# Patient Record
Sex: FEMALE | Race: WHITE | URBAN - METROPOLITAN AREA
[De-identification: names, ages, dates, MRNs, and addresses within clinical notes are randomized per-mention and may not be internally consistent; named-entity substitution may affect disease eponyms.]

---

## 2017-02-09 ENCOUNTER — ANTI-COAG VISIT (OUTPATIENT)
Dept: PHARMACY | Facility: CLINIC | Age: 82
End: 2017-02-09

## 2017-02-09 DIAGNOSIS — I48.20 CHRONIC ATRIAL FIBRILLATION (HCC): ICD-10-CM

## 2017-02-09 LAB — INR BLD: 2.8

## 2017-03-23 ENCOUNTER — ANTI-COAG VISIT (OUTPATIENT)
Dept: PHARMACY | Facility: CLINIC | Age: 82
End: 2017-03-23

## 2017-03-23 DIAGNOSIS — I48.20 CHRONIC ATRIAL FIBRILLATION (HCC): ICD-10-CM

## 2017-03-23 LAB — INR BLD: 3.2

## 2017-03-30 RX ORDER — AMIODARONE HYDROCHLORIDE 200 MG/1
200 TABLET ORAL
Qty: 30 TABLET | Refills: 3 | Status: SHIPPED | OUTPATIENT
Start: 2017-03-30 | End: 2017-05-04 | Stop reason: SDUPTHER

## 2017-04-20 ENCOUNTER — ANTI-COAG VISIT (OUTPATIENT)
Dept: PHARMACY | Facility: CLINIC | Age: 82
End: 2017-04-20

## 2017-04-20 DIAGNOSIS — I48.20 CHRONIC ATRIAL FIBRILLATION (HCC): ICD-10-CM

## 2017-04-20 LAB — INR BLD: 2.7

## 2017-04-25 ENCOUNTER — TELEPHONE (OUTPATIENT)
Dept: ADMINISTRATIVE | Age: 82
End: 2017-04-25

## 2017-05-04 ENCOUNTER — OFFICE VISIT (OUTPATIENT)
Dept: CARDIOLOGY CLINIC | Age: 82
End: 2017-05-04

## 2017-05-04 VITALS
HEIGHT: 62 IN | BODY MASS INDEX: 22.67 KG/M2 | SYSTOLIC BLOOD PRESSURE: 130 MMHG | WEIGHT: 123.2 LBS | OXYGEN SATURATION: 98 % | HEART RATE: 65 BPM | DIASTOLIC BLOOD PRESSURE: 90 MMHG

## 2017-05-04 DIAGNOSIS — Z98.890 H/O MITRAL VALVE REPAIR: ICD-10-CM

## 2017-05-04 DIAGNOSIS — E78.2 MIXED HYPERLIPIDEMIA: ICD-10-CM

## 2017-05-04 DIAGNOSIS — I50.22 CHRONIC SYSTOLIC HEART FAILURE (HCC): ICD-10-CM

## 2017-05-04 DIAGNOSIS — R60.0 BILATERAL EDEMA OF LOWER EXTREMITY: ICD-10-CM

## 2017-05-04 DIAGNOSIS — I10 ESSENTIAL HYPERTENSION: ICD-10-CM

## 2017-05-04 DIAGNOSIS — I48.20 CHRONIC ATRIAL FIBRILLATION (HCC): Primary | ICD-10-CM

## 2017-05-04 LAB — T4 FREE: 1.2 NG/DL (ref 0.9–1.8)

## 2017-05-04 PROCEDURE — 99214 OFFICE O/P EST MOD 30 MIN: CPT | Performed by: INTERNAL MEDICINE

## 2017-05-04 PROCEDURE — 93000 ELECTROCARDIOGRAM COMPLETE: CPT | Performed by: INTERNAL MEDICINE

## 2017-05-04 RX ORDER — ATORVASTATIN CALCIUM 10 MG/1
10 TABLET, FILM COATED ORAL DAILY
Qty: 90 TABLET | Refills: 10 | Status: SHIPPED | OUTPATIENT
Start: 2017-05-04 | End: 2018-01-01 | Stop reason: SDUPTHER

## 2017-05-04 RX ORDER — WARFARIN SODIUM 2 MG/1
TABLET ORAL
Qty: 30 TABLET | Refills: 10 | Status: SHIPPED | OUTPATIENT
Start: 2017-05-04 | End: 2018-02-17 | Stop reason: SDUPTHER

## 2017-05-04 RX ORDER — FUROSEMIDE 40 MG/1
40 TABLET ORAL
Qty: 30 TABLET | Refills: 10 | Status: SHIPPED | OUTPATIENT
Start: 2017-05-04 | End: 2018-05-24 | Stop reason: SDUPTHER

## 2017-05-04 RX ORDER — LISINOPRIL 5 MG/1
5 TABLET ORAL DAILY
Qty: 30 TABLET | Refills: 10 | Status: SHIPPED | OUTPATIENT
Start: 2017-05-04 | End: 2018-04-26 | Stop reason: SDUPTHER

## 2017-05-04 RX ORDER — LEVOTHYROXINE SODIUM 0.03 MG/1
25 TABLET ORAL DAILY
Qty: 30 TABLET | Refills: 10 | Status: SHIPPED | OUTPATIENT
Start: 2017-05-04 | End: 2018-04-05 | Stop reason: SDUPTHER

## 2017-05-04 RX ORDER — AMIODARONE HYDROCHLORIDE 200 MG/1
200 TABLET ORAL
Qty: 30 TABLET | Refills: 10 | Status: SHIPPED | OUTPATIENT
Start: 2017-05-04 | End: 2018-05-24 | Stop reason: SDUPTHER

## 2017-05-04 RX ORDER — CARVEDILOL 25 MG/1
25 TABLET ORAL 2 TIMES DAILY WITH MEALS
Qty: 60 TABLET | Refills: 10 | Status: SHIPPED | OUTPATIENT
Start: 2017-05-04 | End: 2018-01-01 | Stop reason: SDUPTHER

## 2017-05-25 ENCOUNTER — ANTI-COAG VISIT (OUTPATIENT)
Dept: PHARMACY | Facility: CLINIC | Age: 82
End: 2017-05-25

## 2017-05-25 DIAGNOSIS — I48.20 CHRONIC ATRIAL FIBRILLATION (HCC): ICD-10-CM

## 2017-05-25 LAB — INR BLD: 2.2

## 2017-06-22 ENCOUNTER — ANTI-COAG VISIT (OUTPATIENT)
Dept: PHARMACY | Facility: CLINIC | Age: 82
End: 2017-06-22

## 2017-06-22 DIAGNOSIS — I48.20 CHRONIC ATRIAL FIBRILLATION (HCC): ICD-10-CM

## 2017-06-22 LAB — INR BLD: 2

## 2017-07-27 ENCOUNTER — ANTI-COAG VISIT (OUTPATIENT)
Dept: PHARMACY | Facility: CLINIC | Age: 82
End: 2017-07-27

## 2017-07-27 DIAGNOSIS — I48.20 CHRONIC ATRIAL FIBRILLATION (HCC): ICD-10-CM

## 2017-07-27 LAB — INR BLD: 2.8

## 2017-08-24 ENCOUNTER — ANTI-COAG VISIT (OUTPATIENT)
Dept: PHARMACY | Facility: CLINIC | Age: 82
End: 2017-08-24

## 2017-08-24 DIAGNOSIS — I48.20 CHRONIC ATRIAL FIBRILLATION (HCC): ICD-10-CM

## 2017-08-24 LAB — INR BLD: 3

## 2017-09-07 ENCOUNTER — OFFICE VISIT (OUTPATIENT)
Dept: ORTHOPEDIC SURGERY | Age: 82
End: 2017-09-07

## 2017-09-07 VITALS
SYSTOLIC BLOOD PRESSURE: 109 MMHG | HEIGHT: 63 IN | WEIGHT: 119 LBS | DIASTOLIC BLOOD PRESSURE: 60 MMHG | BODY MASS INDEX: 21.09 KG/M2 | HEART RATE: 64 BPM

## 2017-09-07 DIAGNOSIS — S50.02XA LEFT ELBOW CONTUSION: Primary | ICD-10-CM

## 2017-09-07 DIAGNOSIS — M70.22 OLECRANON BURSITIS OF LEFT ELBOW: ICD-10-CM

## 2017-09-07 PROCEDURE — 99214 OFFICE O/P EST MOD 30 MIN: CPT | Performed by: ORTHOPAEDIC SURGERY

## 2017-10-19 ENCOUNTER — ANTI-COAG VISIT (OUTPATIENT)
Dept: PHARMACY | Facility: CLINIC | Age: 82
End: 2017-10-19

## 2017-10-19 DIAGNOSIS — I48.20 CHRONIC ATRIAL FIBRILLATION (HCC): ICD-10-CM

## 2017-10-19 LAB — INR BLD: 2.8

## 2017-11-01 ENCOUNTER — HOSPITAL ENCOUNTER (OUTPATIENT)
Dept: OTHER | Age: 82
Discharge: HOME | End: 2017-11-30
Attending: INTERNAL MEDICINE | Admitting: INTERNAL MEDICINE

## 2017-11-16 ENCOUNTER — ANTI-COAG VISIT (OUTPATIENT)
Dept: PHARMACY | Facility: CLINIC | Age: 82
End: 2017-11-16

## 2017-11-16 DIAGNOSIS — I48.20 CHRONIC ATRIAL FIBRILLATION (HCC): ICD-10-CM

## 2017-11-16 LAB — INR BLD: 2

## 2017-11-16 NOTE — PROGRESS NOTES
Ms. Day Kaur is a 80 y.o.  female with history of Afib who presents today for anticoagulation monitoring and adjustment. Patient verifies current dosing regimen. Patient denies s/s bleeding/bruising/swelling/SOB. No blood in urine or stool. No dietary changes. No changes in medication/OTC agents/Herbals. No change in alcohol use. No missed doses. No Procedures scheduled in the future at this time. Lab Results   Component Value Date    INR 2 11/16/2017    INR 2.8 10/19/2017    INR 3 08/24/2017       Patient appears well. No changes, no problems. Coumadin dose: Continue warfarin 2mg daily EXCEPT 1mg (half tablet) every Thursday and Tuesday      Recheck INR in 4 weeks. Patient reminded to call the Anticoagulation Clinic with any medication changes. Patient given instructions utilizing the teach back method. After visit summary printed and reviewed with patient. Medications reviewed and Warfarin dose updated.

## 2017-11-21 ENCOUNTER — PROCEDURE VISIT (OUTPATIENT)
Dept: CARDIOLOGY CLINIC | Age: 82
End: 2017-11-21

## 2017-11-21 DIAGNOSIS — Z95.0 PACEMAKER: Primary | ICD-10-CM

## 2017-12-01 ENCOUNTER — HOSPITAL ENCOUNTER (OUTPATIENT)
Dept: OTHER | Age: 82
Discharge: HOME | End: 2017-12-31
Attending: INTERNAL MEDICINE | Admitting: INTERNAL MEDICINE

## 2017-12-14 ENCOUNTER — ANTI-COAG VISIT (OUTPATIENT)
Dept: PHARMACY | Facility: CLINIC | Age: 82
End: 2017-12-14

## 2017-12-14 DIAGNOSIS — I48.0 PAROXYSMAL ATRIAL FIBRILLATION (HCC): ICD-10-CM

## 2017-12-14 LAB — INR BLD: 2.4

## 2017-12-14 NOTE — PROGRESS NOTES
Ms. Tiny White is a 80 y.o.  female with history of Afib who presents today for anticoagulation monitoring and adjustment. Patient verifies current dosing regimen  Patient denies s/s bleeding/bruising/swelling/SOB  No blood in urine or stool. No dietary changes. No changes in medication/OTC agents/Herbals. No change in alcohol use. No missed doses. No Procedures scheduled in the future at this time. Lab Results   Component Value Date    INR 2.4 12/14/2017    INR 2 11/16/2017    INR 2.8 10/19/2017       No changes          After visit summary printed and reviewed with patient.       Medications reviewed and updated on home medication list Yes  Warfarin dose updated on patient home medication list:Yes     Influenza vaccine: received    [] given    [] declined   [x] received previously   [] plans to receive at a later time   [] refused    [x] documented in EPIC

## 2018-01-01 ENCOUNTER — ANTI-COAG VISIT (OUTPATIENT)
Dept: PHARMACY | Facility: CLINIC | Age: 83
End: 2018-01-01

## 2018-01-01 ENCOUNTER — TELEPHONE (OUTPATIENT)
Dept: PRIMARY CARE CLINIC | Age: 83
End: 2018-01-01

## 2018-01-01 ENCOUNTER — HOSPITAL ENCOUNTER (OUTPATIENT)
Dept: OTHER | Age: 83
Discharge: HOME | End: 2018-01-31
Attending: INTERNAL MEDICINE | Admitting: INTERNAL MEDICINE

## 2018-01-01 ENCOUNTER — ANTI-COAG VISIT (OUTPATIENT)
Dept: PHARMACY | Age: 83
End: 2018-01-01
Payer: MEDICARE

## 2018-01-01 ENCOUNTER — HOSPITAL ENCOUNTER (OUTPATIENT)
Dept: OTHER | Age: 83
Discharge: HOME | End: 2018-09-01
Attending: INTERNAL MEDICINE | Admitting: INTERNAL MEDICINE

## 2018-01-01 ENCOUNTER — HOSPITAL ENCOUNTER (OUTPATIENT)
Dept: OTHER | Age: 83
Discharge: HOME | End: 2018-07-31
Attending: INTERNAL MEDICINE | Admitting: INTERNAL MEDICINE

## 2018-01-01 ENCOUNTER — TELEPHONE (OUTPATIENT)
Dept: PHARMACY | Facility: CLINIC | Age: 83
End: 2018-01-01

## 2018-01-01 ENCOUNTER — APPOINTMENT (OUTPATIENT)
Dept: PHARMACY | Age: 83
End: 2018-01-01
Payer: MEDICARE

## 2018-01-01 ENCOUNTER — OFFICE VISIT (OUTPATIENT)
Dept: CARDIOLOGY CLINIC | Age: 83
End: 2018-01-01
Payer: MEDICARE

## 2018-01-01 ENCOUNTER — HOSPITAL ENCOUNTER (OUTPATIENT)
Dept: OTHER | Age: 83
Discharge: HOME | End: 2018-08-31
Attending: INTERNAL MEDICINE | Admitting: INTERNAL MEDICINE

## 2018-01-01 VITALS
OXYGEN SATURATION: 98 % | SYSTOLIC BLOOD PRESSURE: 126 MMHG | WEIGHT: 114 LBS | HEIGHT: 63 IN | HEART RATE: 72 BPM | BODY MASS INDEX: 20.2 KG/M2 | DIASTOLIC BLOOD PRESSURE: 68 MMHG

## 2018-01-01 DIAGNOSIS — E78.2 MIXED HYPERLIPIDEMIA: ICD-10-CM

## 2018-01-01 DIAGNOSIS — I50.22 CHRONIC SYSTOLIC HEART FAILURE (HCC): ICD-10-CM

## 2018-01-01 DIAGNOSIS — I48.0 PAROXYSMAL ATRIAL FIBRILLATION (HCC): ICD-10-CM

## 2018-01-01 DIAGNOSIS — I48.20 CHRONIC ATRIAL FIBRILLATION (HCC): Primary | ICD-10-CM

## 2018-01-01 DIAGNOSIS — I48.91 ATRIAL FIBRILLATION, UNSPECIFIED TYPE (HCC): ICD-10-CM

## 2018-01-01 LAB
INR BLD: 2.7
INR BLD: 2.8
INR BLD: 3.3
INTERNATIONAL NORMALIZATION RATIO, POC: 2.6
INTERNATIONAL NORMALIZATION RATIO, POC: 2.9
INTERNATIONAL NORMALIZATION RATIO, POC: 3.3
INTERNATIONAL NORMALIZATION RATIO, POC: 4.5

## 2018-01-01 PROCEDURE — 99211 OFF/OP EST MAY X REQ PHY/QHP: CPT

## 2018-01-01 PROCEDURE — 99213 OFFICE O/P EST LOW 20 MIN: CPT | Performed by: INTERNAL MEDICINE

## 2018-01-01 PROCEDURE — 4040F PNEUMOC VAC/ADMIN/RCVD: CPT | Performed by: INTERNAL MEDICINE

## 2018-01-01 PROCEDURE — 93000 ELECTROCARDIOGRAM COMPLETE: CPT | Performed by: INTERNAL MEDICINE

## 2018-01-01 PROCEDURE — G8420 CALC BMI NORM PARAMETERS: HCPCS | Performed by: INTERNAL MEDICINE

## 2018-01-01 PROCEDURE — 1123F ACP DISCUSS/DSCN MKR DOCD: CPT | Performed by: INTERNAL MEDICINE

## 2018-01-01 PROCEDURE — 85610 PROTHROMBIN TIME: CPT

## 2018-01-01 PROCEDURE — 1036F TOBACCO NON-USER: CPT | Performed by: INTERNAL MEDICINE

## 2018-01-01 PROCEDURE — G8427 DOCREV CUR MEDS BY ELIG CLIN: HCPCS | Performed by: INTERNAL MEDICINE

## 2018-01-01 PROCEDURE — 1090F PRES/ABSN URINE INCON ASSESS: CPT | Performed by: INTERNAL MEDICINE

## 2018-01-01 PROCEDURE — 1101F PT FALLS ASSESS-DOCD LE1/YR: CPT | Performed by: INTERNAL MEDICINE

## 2018-01-01 RX ORDER — FUROSEMIDE 40 MG/1
TABLET ORAL
Qty: 30 TABLET | Refills: 9 | Status: SHIPPED | OUTPATIENT
Start: 2018-01-01

## 2018-01-01 RX ORDER — WARFARIN SODIUM 2 MG/1
TABLET ORAL
Qty: 45 TABLET | Refills: 4 | Status: SHIPPED | OUTPATIENT
Start: 2018-01-01

## 2018-01-01 RX ORDER — AMIODARONE HYDROCHLORIDE 200 MG/1
TABLET ORAL
Qty: 30 TABLET | Refills: 9 | Status: SHIPPED | OUTPATIENT
Start: 2018-01-01

## 2018-01-01 RX ORDER — ATORVASTATIN CALCIUM 10 MG/1
TABLET, FILM COATED ORAL
Qty: 90 TABLET | Refills: 0 | Status: SHIPPED | OUTPATIENT
Start: 2018-01-01 | End: 2018-01-01 | Stop reason: SDUPTHER

## 2018-01-01 RX ORDER — ATORVASTATIN CALCIUM 10 MG/1
TABLET, FILM COATED ORAL
Qty: 90 TABLET | Refills: 0 | Status: SHIPPED | OUTPATIENT
Start: 2018-01-01 | End: 2019-01-01 | Stop reason: SDUPTHER

## 2018-01-01 RX ORDER — CARVEDILOL 25 MG/1
TABLET ORAL
Qty: 60 TABLET | Refills: 9 | Status: SHIPPED | OUTPATIENT
Start: 2018-01-01

## 2018-01-01 RX ORDER — CARVEDILOL 25 MG/1
TABLET ORAL
Qty: 60 TABLET | Refills: 9 | Status: SHIPPED | OUTPATIENT
Start: 2018-01-01 | End: 2018-01-01 | Stop reason: SDUPTHER

## 2018-01-01 RX ORDER — LISINOPRIL 5 MG/1
TABLET ORAL
Qty: 30 TABLET | Refills: 9 | Status: SHIPPED | OUTPATIENT
Start: 2018-01-01

## 2018-01-01 RX ORDER — LEVOTHYROXINE SODIUM 0.03 MG/1
TABLET ORAL
Qty: 30 TABLET | Refills: 9 | Status: SHIPPED | OUTPATIENT
Start: 2018-01-01

## 2018-01-25 ENCOUNTER — ANTI-COAG VISIT (OUTPATIENT)
Dept: PHARMACY | Facility: CLINIC | Age: 83
End: 2018-01-25

## 2018-01-25 DIAGNOSIS — I48.0 PAROXYSMAL ATRIAL FIBRILLATION (HCC): ICD-10-CM

## 2018-01-25 LAB — INTERNATIONAL NORMALIZATION RATIO, POC: 1.9

## 2018-02-01 ENCOUNTER — HOSPITAL ENCOUNTER (OUTPATIENT)
Dept: OTHER | Age: 83
Discharge: HOME | End: 2018-02-28
Attending: INTERNAL MEDICINE | Admitting: INTERNAL MEDICINE

## 2018-02-17 DIAGNOSIS — I48.20 CHRONIC ATRIAL FIBRILLATION (HCC): ICD-10-CM

## 2018-02-19 RX ORDER — WARFARIN SODIUM 2 MG/1
TABLET ORAL
Qty: 45 TABLET | Refills: 4 | Status: SHIPPED | OUTPATIENT
Start: 2018-02-19 | End: 2018-01-01 | Stop reason: SDUPTHER

## 2018-03-01 ENCOUNTER — ANTI-COAG VISIT (OUTPATIENT)
Dept: PHARMACY | Facility: CLINIC | Age: 83
End: 2018-03-01

## 2018-03-01 ENCOUNTER — HOSPITAL ENCOUNTER (OUTPATIENT)
Dept: OTHER | Age: 83
Discharge: HOME | End: 2018-03-31
Attending: INTERNAL MEDICINE | Admitting: INTERNAL MEDICINE

## 2018-03-01 DIAGNOSIS — I48.0 PAROXYSMAL ATRIAL FIBRILLATION (HCC): ICD-10-CM

## 2018-03-01 LAB — INTERNATIONAL NORMALIZATION RATIO, POC: 2.4

## 2018-03-01 NOTE — PROGRESS NOTES
Ms. Ana Webb is a 80 y.o.  female with history of Afib who presents today for anticoagulation monitoring and adjustment. Patient verifies current dosing regimen. Patient denies s/s bleeding/bruising/swelling/SOB. No blood in urine or stool. No dietary changes. No changes in medication/OTC agents/Herbals. No change in alcohol use. No missed doses. No Procedures scheduled in the future at this time. Lab Results   Component Value Date    INR 2.4 03/01/2018    INR 1.9 01/25/2018    INR 2.4 12/14/2017       Patient appears well. No changes, no problems. Patient reminded to call the Anticoagulation Clinic with any medication changes. Patient given instructions utilizing the teach back method. After visit summary printed and reviewed with patient. Warfarin dose updated.

## 2018-04-01 ENCOUNTER — HOSPITAL ENCOUNTER (OUTPATIENT)
Dept: OTHER | Age: 83
Discharge: HOME | End: 2018-04-30
Attending: INTERNAL MEDICINE | Admitting: INTERNAL MEDICINE

## 2018-04-05 ENCOUNTER — ANTI-COAG VISIT (OUTPATIENT)
Dept: PHARMACY | Facility: CLINIC | Age: 83
End: 2018-04-05

## 2018-04-05 DIAGNOSIS — I48.0 PAROXYSMAL ATRIAL FIBRILLATION (HCC): ICD-10-CM

## 2018-04-05 LAB — INTERNATIONAL NORMALIZATION RATIO, POC: 2.2

## 2018-04-05 RX ORDER — LEVOTHYROXINE SODIUM 0.03 MG/1
TABLET ORAL
Qty: 30 TABLET | Refills: 9 | Status: SHIPPED | OUTPATIENT
Start: 2018-04-05 | End: 2018-01-01 | Stop reason: SDUPTHER

## 2018-04-26 DIAGNOSIS — I50.22 CHRONIC SYSTOLIC HEART FAILURE (HCC): ICD-10-CM

## 2018-04-26 RX ORDER — LISINOPRIL 5 MG/1
TABLET ORAL
Qty: 30 TABLET | Refills: 9 | Status: SHIPPED | OUTPATIENT
Start: 2018-04-26 | End: 2018-01-01 | Stop reason: SDUPTHER

## 2018-05-01 ENCOUNTER — HOSPITAL ENCOUNTER (OUTPATIENT)
Dept: OTHER | Age: 83
Discharge: HOME | End: 2018-05-31
Attending: INTERNAL MEDICINE | Admitting: INTERNAL MEDICINE

## 2018-05-10 ENCOUNTER — ANTI-COAG VISIT (OUTPATIENT)
Dept: PHARMACY | Facility: CLINIC | Age: 83
End: 2018-05-10

## 2018-05-10 DIAGNOSIS — I48.0 PAROXYSMAL ATRIAL FIBRILLATION (HCC): ICD-10-CM

## 2018-05-10 LAB — INTERNATIONAL NORMALIZATION RATIO, POC: 3

## 2018-05-24 DIAGNOSIS — I50.22 CHRONIC SYSTOLIC HEART FAILURE (HCC): ICD-10-CM

## 2018-05-24 DIAGNOSIS — I48.20 CHRONIC ATRIAL FIBRILLATION (HCC): ICD-10-CM

## 2018-05-24 RX ORDER — FUROSEMIDE 40 MG/1
TABLET ORAL
Qty: 30 TABLET | Refills: 9 | Status: SHIPPED | OUTPATIENT
Start: 2018-05-24 | End: 2018-01-01 | Stop reason: SDUPTHER

## 2018-05-24 RX ORDER — AMIODARONE HYDROCHLORIDE 200 MG/1
TABLET ORAL
Qty: 30 TABLET | Refills: 9 | Status: SHIPPED | OUTPATIENT
Start: 2018-05-24 | End: 2018-01-01 | Stop reason: SDUPTHER

## 2018-06-01 ENCOUNTER — HOSPITAL ENCOUNTER (OUTPATIENT)
Dept: OTHER | Age: 83
Discharge: HOME | End: 2018-06-30
Attending: INTERNAL MEDICINE | Admitting: INTERNAL MEDICINE

## 2018-08-30 NOTE — PROGRESS NOTES
Ms. Krystal Waters is a 80 y.o.  female with history of Afib who presents today for anticoagulation monitoring and adjustment. Patient verifies current dosing regimen  Patient denies s/s bleeding/bruising/swelling/SOB  No blood in urine or stool. No dietary changes. No changes in medication/OTC agents/Herbals. No change in alcohol use. No missed doses. No Procedures scheduled in the future at this time. Lab Results   Component Value Date    INR 2.80 08/30/2018    INR 3.30 07/19/2018    INR 2.70 06/14/2018       Pertinent findings: No changes, no problems. Denies bleeding or bruising. INR is therapeutic today at 2.8. Will continue current dose and recheck INR in 5 weeks per Dr. Andrea Mckeon referral.     Warfarin dosing: Continue Warfarin 2mg daily EXCEPT 1mg (half tablet) every Thursday and Tuesday        After visit summary printed and reviewed with patient.

## 2018-09-27 NOTE — PROGRESS NOTES
[address]   Cardiac Evaluation      Patient: Lexi Blankenship  YOB: 1929  Date: 9/27/18     Chief Complaint   Patient presents with    Atrial Fibrillation    Medication Refill     orders pending         Referring provider: Al Mesa MD    History of Present Illness:  Ms. Sujey Prescott is an 79yo female with history of mitral valve repair, severe cardiomyopathy, s/p biv pacemaker as well as chronic cor pulmonale who was admitted 9/2014 with decompensated CHF. Robotic mitral valve repair (nl coronaries and LV function) by Dr Gavino Joseph. She has history Biv ICD placed by Dr. Leandra Pleitez  12/2013    Today she presents for 6 month follow up AFIB and CHF. She presents with her daughter today in office. She reports her mother's memory is now poor. Skip Salazar states her breathing is well overall. She does present in wheelchair today, but states she is able to walk around her home without limitations. She has done well since Biv ICD. She lives with family in [place] home. She is able to walk up and down the stairs with no complaints. She uses a cane for ambulation. She has had no falls. Denies any dyspnea, chest pain, palpitations and dizziness. Past Medical History:   has a past medical history of Atrial fibrillation ([facility] [address] Cardiomyopathy [facility]); CHF (congestive heart failure) ([facility] [address] Heart murmur; Hyperlipidemia; Hypertension; Mitral insufficiency; and Pacemaker. Surgical History:   has a past surgical history that includes Diagnostic Cardiac Cath Lab Procedure; Coronary angioplasty; Mitral valve surgery; Total hip arthroplasty; pacemaker placement (11/18/11); joint replacement; eye surgery (Bilateral); and pacemaker placement (12/23/13). Social History:  Social History     Social History    Marital status:      Spouse name: N/A    Number of children: N/A    Years of education: N/A     Occupational History    Not on file.      Social History Main Topics    Smoking status: Never Smoker    Smokeless tobacco: Never Used    Alcohol use No    Drug use: No    Sexual activity: No     Other Topics Concern    Not on file     Social History Narrative    No narrative on file       Family History:  family history includes Other in her father and mother. Allergies:  Patient has no known allergies. Review of Systems:   · Constitutional: there has been no unanticipated weight loss. No change in energy or activity level   · Eyes: No visual changes   · ENT: No Headaches, hearing loss or vertigo. No mouth sores or sore throat. · Cardiovascular: Reviewed in HPI  · Respiratory: No cough or wheezing, no sputum production. · Gastrointestinal: No abdominal pain, appetite loss, blood in stools. No change in bowel or bladder habits. · Genitourinary: No nocturia, dysuria, trouble voiding  · Musculoskeletal:  No gait disturbance, weakness or joint complaints. · Integumentary: No rash or pruritis. · Neurological: No headache, change in muscle strength, numbness or tingling. No change in gait, balance, coordination, mood, affect, memory, mentation, behavior. · Psychiatric: No anxiety or depression  · Endocrine: No malaise or fever  · Hematologic/Lymphatic: No abnormal bruising or bleeding, blood clots or swollen lymph nodes. · Allergic/Immunologic: No nasal congestion or hives. Physical Examination:    Vitals:    09/27/18 0915   BP: 126/68   Site: Left Upper Arm   Position: Sitting   Pulse: 72   SpO2: 98%   Weight: 114 lb (51.7 kg)   Height: 5' 3\" (1.6 m)     Body mass index is 20.19 kg/m².      Wt Readings from Last 3 Encounters:   09/27/18 114 lb (51.7 kg)   09/07/17 119 lb (54 kg)   09/04/17 119 lb 7.8 oz (54.2 kg)      BP Readings from Last 3 Encounters:   09/27/18 126/68   09/07/17 109/60   09/04/17 136/64      Constitutional and General Appearance:  appears stated age  Respiratory:  · Normal excursion and expansion without use of accessory muscles  · Resp Auscultation: Normal breath sounds without dullness  Cardiovascular:  · The apical impulses not displaced  · Heart is regular rate and rhythm with normal S1, S2  · The carotid upstroke is normal, no bruit noted   · JVP is not elevated  · Peripheral pulses are symmetrical  · There is no clubbing, cyanosis of the extremities  · 1+ edema  · Femoral Arteries: 2+ and equal  · Pedal Pulses: 2+ and equal   Abdomen:  · No masses or tenderness  · Normal bowel sounds  Neurological/Psychiatric:  · Alert and oriented x3  · Moves all extremities well  · Exhibits normal gait balance and coordination    EK2017, AV Paced    Echo: 2015  Overall left ventricular systolic function is moderately to severely reduced. Ejection fraction is visually estimated to be 30-35 % with diffuse hypokinesis and abnomal septal motion secondary to RV pacing. The right ventricle is not well visualized but systolic function appears mildly reduced. Pacer / ICD wire is visualized in the right ventricle. The left atrium is mildly dilated. Severe mitral annular calcification. Trivial mitral regurgitation. Trivial tricuspid regurgitation. Mild pulmonic regurgitation. [facility] BiV ICD implanted 13 by Dr Roni Hernandez interrogated    Assessment/Plan:      CHF  Patient with a history of cardiomyopathy  EF 30-35% per ECHO 2015  Compensated. Today  Has done very well since BiVICD    Atrial Fibrillation/BiV ICD   Device interrogated done today shows normal function with a few episodes. The longest being 11 minutes  EKG today shows AV Paced; no A. fib  On Amiodarone 200 mg 5 days weekly; Warfarin   INR 2.8 on 2018    Mitral valve repair  Functioning fine. No murmur heard    LE edema  Chronic; unchanged  Encouraged to continue daily walking as tolerated  Takes Lasix 40 mg 5 times weekly. She will return in follow up 6 months with device interrogation. Thank you for allowing to me to participate in the care of [facility], [facility].      Vena Bras,

## 2018-11-08 NOTE — PROGRESS NOTES
Ms. Sasha Washington is a 80 y.o.  female with history of Afib who presents today for anticoagulation monitoring and adjustment. Patient verifies current dosing regimen. Patient denies s/s bleeding/bruising/swelling/SOB. No blood in urine or stool. No dietary changes. No changes in medication/OTC agents/Herbals. No change in alcohol use. No missed doses. No Procedures scheduled in the future at this time. Lab Results   Component Value Date    INR 3.3 11/08/2018    INR 2.6 10/11/2018    INR 4.5 10/04/2018       Patient appears well. No changes, no problems. Coumadin Dose :   HOLD Warfarin today 11-8-18. Then Continue Warfarin 2mg daily EXCEPT 1mg (half tablet) every Thursday and Tuesday      Return in 3 weeks. Patient reminded to call the Anticoagulation Clinic with any medication changes. Patient given instructions utilizing the teach back method. After visit summary printed and reviewed with patient. Warfarin dose updated.

## 2019-01-01 ENCOUNTER — ANTI-COAG VISIT (OUTPATIENT)
Dept: PHARMACY | Age: 84
End: 2019-01-01
Payer: MEDICARE

## 2019-01-01 ENCOUNTER — HOSPITAL ENCOUNTER (INPATIENT)
Age: 84
LOS: 1 days | DRG: 682 | End: 2019-06-03
Attending: EMERGENCY MEDICINE | Admitting: INTERNAL MEDICINE
Payer: MEDICARE

## 2019-01-01 ENCOUNTER — HOSPITAL ENCOUNTER (INPATIENT)
Age: 84
LOS: 9 days | Discharge: NURSING, REHAB OR LONG-TERM CARE | DRG: 551 | End: 2019-05-14
Attending: INTERNAL MEDICINE | Admitting: INTERNAL MEDICINE
Payer: MEDICARE

## 2019-01-01 ENCOUNTER — APPOINTMENT (OUTPATIENT)
Dept: CT IMAGING | Age: 84
End: 2019-01-01
Payer: MEDICARE

## 2019-01-01 ENCOUNTER — HOSPITAL ENCOUNTER (EMERGENCY)
Age: 84
Discharge: ANOTHER HOSPITAL | End: 2019-05-04
Attending: EMERGENCY MEDICINE
Payer: MEDICARE

## 2019-01-01 ENCOUNTER — TELEPHONE (OUTPATIENT)
Dept: PHARMACY | Age: 84
End: 2019-01-01

## 2019-01-01 ENCOUNTER — APPOINTMENT (OUTPATIENT)
Dept: CT IMAGING | Age: 84
DRG: 551 | End: 2019-01-01
Attending: INTERNAL MEDICINE
Payer: MEDICARE

## 2019-01-01 ENCOUNTER — APPOINTMENT (OUTPATIENT)
Dept: GENERAL RADIOLOGY | Age: 84
DRG: 682 | End: 2019-01-01
Payer: MEDICARE

## 2019-01-01 ENCOUNTER — APPOINTMENT (OUTPATIENT)
Dept: GENERAL RADIOLOGY | Age: 84
End: 2019-01-01
Payer: MEDICARE

## 2019-01-01 VITALS
HEART RATE: 71 BPM | TEMPERATURE: 90.9 F | RESPIRATION RATE: 24 BRPM | DIASTOLIC BLOOD PRESSURE: 54 MMHG | SYSTOLIC BLOOD PRESSURE: 112 MMHG | OXYGEN SATURATION: 77 % | WEIGHT: 140.21 LBS | HEIGHT: 62 IN | BODY MASS INDEX: 25.8 KG/M2

## 2019-01-01 VITALS
HEART RATE: 80 BPM | BODY MASS INDEX: 31.29 KG/M2 | HEIGHT: 63 IN | DIASTOLIC BLOOD PRESSURE: 79 MMHG | WEIGHT: 176.6 LBS | RESPIRATION RATE: 18 BRPM | TEMPERATURE: 97.5 F | OXYGEN SATURATION: 93 % | SYSTOLIC BLOOD PRESSURE: 113 MMHG

## 2019-01-01 VITALS
HEART RATE: 97 BPM | DIASTOLIC BLOOD PRESSURE: 77 MMHG | BODY MASS INDEX: 21.44 KG/M2 | OXYGEN SATURATION: 97 % | WEIGHT: 121 LBS | HEIGHT: 63 IN | TEMPERATURE: 97.6 F | SYSTOLIC BLOOD PRESSURE: 126 MMHG | RESPIRATION RATE: 18 BRPM

## 2019-01-01 DIAGNOSIS — S90.821A BLISTER OF RIGHT FOOT, INITIAL ENCOUNTER: ICD-10-CM

## 2019-01-01 DIAGNOSIS — S12.001A CLOSED NONDISPLACED FRACTURE OF FIRST CERVICAL VERTEBRA, UNSPECIFIED FRACTURE MORPHOLOGY, INITIAL ENCOUNTER (HCC): ICD-10-CM

## 2019-01-01 DIAGNOSIS — N18.9 CHRONIC KIDNEY DISEASE, UNSPECIFIED CKD STAGE: ICD-10-CM

## 2019-01-01 DIAGNOSIS — R60.0 LEG EDEMA: ICD-10-CM

## 2019-01-01 DIAGNOSIS — N17.0 ACUTE RENAL FAILURE WITH TUBULAR NECROSIS (HCC): ICD-10-CM

## 2019-01-01 DIAGNOSIS — I50.21 ACUTE SYSTOLIC CONGESTIVE HEART FAILURE (HCC): Primary | ICD-10-CM

## 2019-01-01 DIAGNOSIS — I48.91 ATRIAL FIBRILLATION, UNSPECIFIED TYPE (HCC): ICD-10-CM

## 2019-01-01 DIAGNOSIS — E78.2 MIXED HYPERLIPIDEMIA: ICD-10-CM

## 2019-01-01 DIAGNOSIS — E87.5 HYPERKALEMIA: ICD-10-CM

## 2019-01-01 DIAGNOSIS — S12.100A CLOSED ODONTOID FRACTURE, INITIAL ENCOUNTER (HCC): Primary | ICD-10-CM

## 2019-01-01 DIAGNOSIS — S90.822A BLISTER (NONTHERMAL), LEFT FOOT, INITIAL ENCOUNTER: ICD-10-CM

## 2019-01-01 LAB
A/G RATIO: 1.1 (ref 1.1–2.2)
A/G RATIO: 1.3 (ref 1.1–2.2)
ALBUMIN SERPL-MCNC: 2.9 G/DL (ref 3.4–5)
ALBUMIN SERPL-MCNC: 3 G/DL (ref 3.4–5)
ALBUMIN SERPL-MCNC: 3.2 G/DL (ref 3.4–5)
ALBUMIN SERPL-MCNC: 3.3 G/DL (ref 3.4–5)
ALBUMIN SERPL-MCNC: 3.3 G/DL (ref 3.4–5)
ALBUMIN SERPL-MCNC: 3.4 G/DL (ref 3.4–5)
ALBUMIN SERPL-MCNC: 3.4 G/DL (ref 3.4–5)
ALBUMIN SERPL-MCNC: 3.5 G/DL (ref 3.4–5)
ALBUMIN SERPL-MCNC: 3.6 G/DL (ref 3.4–5)
ALP BLD-CCNC: 108 U/L (ref 40–129)
ALP BLD-CCNC: 92 U/L (ref 40–129)
ALT SERPL-CCNC: 24 U/L (ref 10–40)
ALT SERPL-CCNC: 31 U/L (ref 10–40)
ANION GAP SERPL CALCULATED.3IONS-SCNC: 12 MMOL/L (ref 3–16)
ANION GAP SERPL CALCULATED.3IONS-SCNC: 12 MMOL/L (ref 3–16)
ANION GAP SERPL CALCULATED.3IONS-SCNC: 13 MMOL/L (ref 3–16)
ANION GAP SERPL CALCULATED.3IONS-SCNC: 14 MMOL/L (ref 3–16)
ANION GAP SERPL CALCULATED.3IONS-SCNC: 15 MMOL/L (ref 3–16)
ANION GAP SERPL CALCULATED.3IONS-SCNC: 15 MMOL/L (ref 3–16)
ANION GAP SERPL CALCULATED.3IONS-SCNC: 17 MMOL/L (ref 3–16)
ANION GAP SERPL CALCULATED.3IONS-SCNC: 20 MMOL/L (ref 3–16)
APTT: 37.8 SEC (ref 26–36)
AST SERPL-CCNC: 26 U/L (ref 15–37)
AST SERPL-CCNC: 35 U/L (ref 15–37)
BACTERIA: ABNORMAL /HPF
BANDED NEUTROPHILS RELATIVE PERCENT: 2 % (ref 0–7)
BASE EXCESS ARTERIAL: -5.4 MMOL/L (ref -3–3)
BASOPHILS ABSOLUTE: 0 K/UL (ref 0–0.2)
BASOPHILS ABSOLUTE: 0.1 K/UL (ref 0–0.2)
BASOPHILS RELATIVE PERCENT: 0 %
BASOPHILS RELATIVE PERCENT: 0.2 %
BASOPHILS RELATIVE PERCENT: 0.3 %
BASOPHILS RELATIVE PERCENT: 0.4 %
BASOPHILS RELATIVE PERCENT: 0.8 %
BASOPHILS RELATIVE PERCENT: 1 %
BASOPHILS RELATIVE PERCENT: 1.1 %
BILIRUB SERPL-MCNC: 0.5 MG/DL (ref 0–1)
BILIRUB SERPL-MCNC: 1.1 MG/DL (ref 0–1)
BILIRUBIN URINE: ABNORMAL
BILIRUBIN URINE: NEGATIVE
BILIRUBIN URINE: NEGATIVE
BLOOD CULTURE, ROUTINE: NORMAL
BLOOD, URINE: ABNORMAL
BLOOD, URINE: ABNORMAL
BLOOD, URINE: NEGATIVE
BUN BLDV-MCNC: 26 MG/DL (ref 7–20)
BUN BLDV-MCNC: 27 MG/DL (ref 7–20)
BUN BLDV-MCNC: 28 MG/DL (ref 7–20)
BUN BLDV-MCNC: 31 MG/DL (ref 7–20)
BUN BLDV-MCNC: 31 MG/DL (ref 7–20)
BUN BLDV-MCNC: 38 MG/DL (ref 7–20)
BUN BLDV-MCNC: 41 MG/DL (ref 7–20)
BUN BLDV-MCNC: 49 MG/DL (ref 7–20)
BUN BLDV-MCNC: 50 MG/DL (ref 7–20)
BUN BLDV-MCNC: 50 MG/DL (ref 7–20)
BUN BLDV-MCNC: 51 MG/DL (ref 7–20)
BUN BLDV-MCNC: 52 MG/DL (ref 7–20)
BUN BLDV-MCNC: 56 MG/DL (ref 7–20)
CALCIUM SERPL-MCNC: 8.4 MG/DL (ref 8.3–10.6)
CALCIUM SERPL-MCNC: 8.4 MG/DL (ref 8.3–10.6)
CALCIUM SERPL-MCNC: 8.5 MG/DL (ref 8.3–10.6)
CALCIUM SERPL-MCNC: 8.6 MG/DL (ref 8.3–10.6)
CALCIUM SERPL-MCNC: 8.6 MG/DL (ref 8.3–10.6)
CALCIUM SERPL-MCNC: 8.7 MG/DL (ref 8.3–10.6)
CALCIUM SERPL-MCNC: 8.7 MG/DL (ref 8.3–10.6)
CALCIUM SERPL-MCNC: 8.8 MG/DL (ref 8.3–10.6)
CALCIUM SERPL-MCNC: 8.9 MG/DL (ref 8.3–10.6)
CALCIUM SERPL-MCNC: 9 MG/DL (ref 8.3–10.6)
CALCIUM SERPL-MCNC: 9.1 MG/DL (ref 8.3–10.6)
CARBOXYHEMOGLOBIN ARTERIAL: 1.1 % (ref 0–1.5)
CHLORIDE BLD-SCNC: 102 MMOL/L (ref 99–110)
CHLORIDE BLD-SCNC: 103 MMOL/L (ref 99–110)
CHLORIDE BLD-SCNC: 105 MMOL/L (ref 99–110)
CHLORIDE BLD-SCNC: 106 MMOL/L (ref 99–110)
CHLORIDE BLD-SCNC: 107 MMOL/L (ref 99–110)
CHLORIDE BLD-SCNC: 108 MMOL/L (ref 99–110)
CHLORIDE BLD-SCNC: 109 MMOL/L (ref 99–110)
CHLORIDE BLD-SCNC: 110 MMOL/L (ref 99–110)
CLARITY: ABNORMAL
CLARITY: ABNORMAL
CLARITY: CLEAR
CO2: 20 MMOL/L (ref 21–32)
CO2: 22 MMOL/L (ref 21–32)
CO2: 23 MMOL/L (ref 21–32)
CO2: 24 MMOL/L (ref 21–32)
CO2: 24 MMOL/L (ref 21–32)
CO2: 26 MMOL/L (ref 21–32)
CO2: 26 MMOL/L (ref 21–32)
CO2: 27 MMOL/L (ref 21–32)
CO2: 28 MMOL/L (ref 21–32)
COLOR: ABNORMAL
COLOR: YELLOW
COLOR: YELLOW
CREAT SERPL-MCNC: 1.4 MG/DL (ref 0.6–1.2)
CREAT SERPL-MCNC: 1.4 MG/DL (ref 0.6–1.2)
CREAT SERPL-MCNC: 1.6 MG/DL (ref 0.6–1.2)
CREAT SERPL-MCNC: 1.8 MG/DL (ref 0.6–1.2)
CREAT SERPL-MCNC: 1.9 MG/DL (ref 0.6–1.2)
CREAT SERPL-MCNC: 2 MG/DL (ref 0.6–1.2)
CREAT SERPL-MCNC: 2.2 MG/DL (ref 0.6–1.2)
CREAT SERPL-MCNC: 2.3 MG/DL (ref 0.6–1.2)
CREAT SERPL-MCNC: 2.4 MG/DL (ref 0.6–1.2)
CREAT SERPL-MCNC: 2.7 MG/DL (ref 0.6–1.2)
CULTURE, BLOOD 2: NORMAL
EKG ATRIAL RATE: 115 BPM
EKG ATRIAL RATE: 62 BPM
EKG ATRIAL RATE: 75 BPM
EKG DIAGNOSIS: NORMAL
EKG P-R INTERVAL: 80 MS
EKG Q-T INTERVAL: 344 MS
EKG Q-T INTERVAL: 482 MS
EKG Q-T INTERVAL: 534 MS
EKG QRS DURATION: 166 MS
EKG QRS DURATION: 180 MS
EKG QRS DURATION: 188 MS
EKG QTC CALCULATION (BAZETT): 436 MS
EKG QTC CALCULATION (BAZETT): 542 MS
EKG QTC CALCULATION (BAZETT): 628 MS
EKG R AXIS: -68 DEGREES
EKG R AXIS: -71 DEGREES
EKG R AXIS: -89 DEGREES
EKG T AXIS: 7 DEGREES
EKG T AXIS: 77 DEGREES
EKG T AXIS: 95 DEGREES
EKG VENTRICULAR RATE: 102 BPM
EKG VENTRICULAR RATE: 62 BPM
EKG VENTRICULAR RATE: 97 BPM
EOSINOPHILS ABSOLUTE: 0 K/UL (ref 0–0.6)
EOSINOPHILS ABSOLUTE: 0.1 K/UL (ref 0–0.6)
EOSINOPHILS RELATIVE PERCENT: 0 %
EOSINOPHILS RELATIVE PERCENT: 0.2 %
EOSINOPHILS RELATIVE PERCENT: 0.5 %
EOSINOPHILS RELATIVE PERCENT: 0.5 %
EOSINOPHILS RELATIVE PERCENT: 0.9 %
EOSINOPHILS RELATIVE PERCENT: 1 %
EOSINOPHILS RELATIVE PERCENT: 1.3 %
EOSINOPHILS RELATIVE PERCENT: 1.5 %
EPITHELIAL CELLS, UA: 0 /HPF (ref 0–5)
EPITHELIAL CELLS, UA: 1 /HPF (ref 0–5)
EPITHELIAL CELLS, UA: ABNORMAL /HPF
FOLATE: >20 NG/ML (ref 4.78–24.2)
GFR AFRICAN AMERICAN: 20
GFR AFRICAN AMERICAN: 23
GFR AFRICAN AMERICAN: 24
GFR AFRICAN AMERICAN: 25
GFR AFRICAN AMERICAN: 28
GFR AFRICAN AMERICAN: 30
GFR AFRICAN AMERICAN: 32
GFR AFRICAN AMERICAN: 37
GFR AFRICAN AMERICAN: 43
GFR AFRICAN AMERICAN: 43
GFR NON-AFRICAN AMERICAN: 17
GFR NON-AFRICAN AMERICAN: 19
GFR NON-AFRICAN AMERICAN: 20
GFR NON-AFRICAN AMERICAN: 21
GFR NON-AFRICAN AMERICAN: 23
GFR NON-AFRICAN AMERICAN: 25
GFR NON-AFRICAN AMERICAN: 26
GFR NON-AFRICAN AMERICAN: 30
GFR NON-AFRICAN AMERICAN: 35
GFR NON-AFRICAN AMERICAN: 35
GLOBULIN: 2.8 G/DL
GLOBULIN: 3.1 G/DL
GLUCOSE BLD-MCNC: 101 MG/DL (ref 70–99)
GLUCOSE BLD-MCNC: 101 MG/DL (ref 70–99)
GLUCOSE BLD-MCNC: 106 MG/DL (ref 70–99)
GLUCOSE BLD-MCNC: 134 MG/DL (ref 70–99)
GLUCOSE BLD-MCNC: 143 MG/DL (ref 70–99)
GLUCOSE BLD-MCNC: 70 MG/DL (ref 70–99)
GLUCOSE BLD-MCNC: 73 MG/DL (ref 70–99)
GLUCOSE BLD-MCNC: 86 MG/DL (ref 70–99)
GLUCOSE BLD-MCNC: 87 MG/DL (ref 70–99)
GLUCOSE BLD-MCNC: 87 MG/DL (ref 70–99)
GLUCOSE BLD-MCNC: 88 MG/DL (ref 70–99)
GLUCOSE BLD-MCNC: 95 MG/DL (ref 70–99)
GLUCOSE BLD-MCNC: 97 MG/DL (ref 70–99)
GLUCOSE URINE: NEGATIVE MG/DL
HCO3 ARTERIAL: 22.6 MMOL/L (ref 21–29)
HCT VFR BLD CALC: 33.9 % (ref 36–48)
HCT VFR BLD CALC: 33.9 % (ref 36–48)
HCT VFR BLD CALC: 35.1 % (ref 36–48)
HCT VFR BLD CALC: 35.4 % (ref 36–48)
HCT VFR BLD CALC: 36.1 % (ref 36–48)
HCT VFR BLD CALC: 36.2 % (ref 36–48)
HCT VFR BLD CALC: 36.6 % (ref 36–48)
HCT VFR BLD CALC: 36.6 % (ref 36–48)
HCT VFR BLD CALC: 37.3 % (ref 36–48)
HCT VFR BLD CALC: 37.6 % (ref 36–48)
HCT VFR BLD CALC: 37.7 % (ref 36–48)
HCT VFR BLD CALC: 38.6 % (ref 36–48)
HEMOGLOBIN, ART, EXTENDED: 11.1 G/DL (ref 12–16)
HEMOGLOBIN: 11 G/DL (ref 12–16)
HEMOGLOBIN: 11 G/DL (ref 12–16)
HEMOGLOBIN: 11.2 G/DL (ref 12–16)
HEMOGLOBIN: 11.4 G/DL (ref 12–16)
HEMOGLOBIN: 11.6 G/DL (ref 12–16)
HEMOGLOBIN: 12 G/DL (ref 12–16)
HEMOGLOBIN: 12 G/DL (ref 12–16)
HEMOGLOBIN: 12.2 G/DL (ref 12–16)
HEMOGLOBIN: 12.5 G/DL (ref 12–16)
HYALINE CASTS: 1 /LPF (ref 0–8)
HYALINE CASTS: 3 /LPF (ref 0–8)
INR BLD: 10.45 (ref 0.86–1.14)
INR BLD: 2.35 (ref 0.86–1.14)
INR BLD: 2.49 (ref 0.86–1.14)
INR BLD: 2.7
INR BLD: 2.93 (ref 0.86–1.14)
INR BLD: 3.39 (ref 0.86–1.14)
INR BLD: 3.77 (ref 0.86–1.14)
INR BLD: 3.84 (ref 0.86–1.14)
INR BLD: 4.04 (ref 0.86–1.14)
INR BLD: 4.1 (ref 0.86–1.14)
INR BLD: 4.15 (ref 0.86–1.14)
INR BLD: 8.74 (ref 0.86–1.14)
INTERNATIONAL NORMALIZATION RATIO, POC: 2.9
KETONES, URINE: ABNORMAL MG/DL
KETONES, URINE: NEGATIVE MG/DL
KETONES, URINE: NEGATIVE MG/DL
LACTIC ACID: 1.4 MMOL/L (ref 0.4–2)
LEUKOCYTE ESTERASE, URINE: ABNORMAL
LEUKOCYTE ESTERASE, URINE: ABNORMAL
LEUKOCYTE ESTERASE, URINE: NEGATIVE
LYMPHOCYTES ABSOLUTE: 0.4 K/UL (ref 1–5.1)
LYMPHOCYTES ABSOLUTE: 0.5 K/UL (ref 1–5.1)
LYMPHOCYTES ABSOLUTE: 0.6 K/UL (ref 1–5.1)
LYMPHOCYTES ABSOLUTE: 0.6 K/UL (ref 1–5.1)
LYMPHOCYTES ABSOLUTE: 0.7 K/UL (ref 1–5.1)
LYMPHOCYTES ABSOLUTE: 0.8 K/UL (ref 1–5.1)
LYMPHOCYTES ABSOLUTE: 0.9 K/UL (ref 1–5.1)
LYMPHOCYTES RELATIVE PERCENT: 10.1 %
LYMPHOCYTES RELATIVE PERCENT: 10.4 %
LYMPHOCYTES RELATIVE PERCENT: 2 %
LYMPHOCYTES RELATIVE PERCENT: 5.2 %
LYMPHOCYTES RELATIVE PERCENT: 6.6 %
LYMPHOCYTES RELATIVE PERCENT: 6.6 %
LYMPHOCYTES RELATIVE PERCENT: 7 %
LYMPHOCYTES RELATIVE PERCENT: 7.1 %
LYMPHOCYTES RELATIVE PERCENT: 8.3 %
LYMPHOCYTES RELATIVE PERCENT: 8.4 %
LYMPHOCYTES RELATIVE PERCENT: 8.4 %
LYMPHOCYTES RELATIVE PERCENT: 8.9 %
MCH RBC QN AUTO: 31.1 PG (ref 26–34)
MCH RBC QN AUTO: 31.2 PG (ref 26–34)
MCH RBC QN AUTO: 31.3 PG (ref 26–34)
MCH RBC QN AUTO: 31.4 PG (ref 26–34)
MCH RBC QN AUTO: 31.4 PG (ref 26–34)
MCH RBC QN AUTO: 31.5 PG (ref 26–34)
MCH RBC QN AUTO: 31.6 PG (ref 26–34)
MCH RBC QN AUTO: 31.8 PG (ref 26–34)
MCH RBC QN AUTO: 31.9 PG (ref 26–34)
MCH RBC QN AUTO: 31.9 PG (ref 26–34)
MCH RBC QN AUTO: 32.2 PG (ref 26–34)
MCH RBC QN AUTO: 32.3 PG (ref 26–34)
MCHC RBC AUTO-ENTMCNC: 31.6 G/DL (ref 31–36)
MCHC RBC AUTO-ENTMCNC: 31.7 G/DL (ref 31–36)
MCHC RBC AUTO-ENTMCNC: 31.9 G/DL (ref 31–36)
MCHC RBC AUTO-ENTMCNC: 32 G/DL (ref 31–36)
MCHC RBC AUTO-ENTMCNC: 32.1 G/DL (ref 31–36)
MCHC RBC AUTO-ENTMCNC: 32.2 G/DL (ref 31–36)
MCHC RBC AUTO-ENTMCNC: 32.2 G/DL (ref 31–36)
MCHC RBC AUTO-ENTMCNC: 32.3 G/DL (ref 31–36)
MCHC RBC AUTO-ENTMCNC: 32.4 G/DL (ref 31–36)
MCHC RBC AUTO-ENTMCNC: 32.5 G/DL (ref 31–36)
MCHC RBC AUTO-ENTMCNC: 32.6 G/DL (ref 31–36)
MCHC RBC AUTO-ENTMCNC: 32.6 G/DL (ref 31–36)
MCV RBC AUTO: 100.4 FL (ref 80–100)
MCV RBC AUTO: 102 FL (ref 80–100)
MCV RBC AUTO: 96.6 FL (ref 80–100)
MCV RBC AUTO: 96.8 FL (ref 80–100)
MCV RBC AUTO: 97.5 FL (ref 80–100)
MCV RBC AUTO: 97.8 FL (ref 80–100)
MCV RBC AUTO: 97.9 FL (ref 80–100)
MCV RBC AUTO: 98 FL (ref 80–100)
MCV RBC AUTO: 98 FL (ref 80–100)
MCV RBC AUTO: 98.3 FL (ref 80–100)
MCV RBC AUTO: 98.4 FL (ref 80–100)
MCV RBC AUTO: 98.4 FL (ref 80–100)
METHEMOGLOBIN ARTERIAL: 0.7 %
MICROSCOPIC EXAMINATION: YES
MONOCYTES ABSOLUTE: 0.5 K/UL (ref 0–1.3)
MONOCYTES ABSOLUTE: 0.5 K/UL (ref 0–1.3)
MONOCYTES ABSOLUTE: 0.6 K/UL (ref 0–1.3)
MONOCYTES ABSOLUTE: 0.7 K/UL (ref 0–1.3)
MONOCYTES ABSOLUTE: 0.8 K/UL (ref 0–1.3)
MONOCYTES ABSOLUTE: 0.8 K/UL (ref 0–1.3)
MONOCYTES ABSOLUTE: 0.9 K/UL (ref 0–1.3)
MONOCYTES RELATIVE PERCENT: 4 %
MONOCYTES RELATIVE PERCENT: 7.3 %
MONOCYTES RELATIVE PERCENT: 7.3 %
MONOCYTES RELATIVE PERCENT: 7.6 %
MONOCYTES RELATIVE PERCENT: 7.9 %
MONOCYTES RELATIVE PERCENT: 8.1 %
MONOCYTES RELATIVE PERCENT: 8.4 %
MONOCYTES RELATIVE PERCENT: 8.4 %
MONOCYTES RELATIVE PERCENT: 8.6 %
MONOCYTES RELATIVE PERCENT: 8.6 %
MONOCYTES RELATIVE PERCENT: 8.7 %
MONOCYTES RELATIVE PERCENT: 8.9 %
NEUTROPHILS ABSOLUTE: 18.1 K/UL (ref 1.7–7.7)
NEUTROPHILS ABSOLUTE: 5.2 K/UL (ref 1.7–7.7)
NEUTROPHILS ABSOLUTE: 5.7 K/UL (ref 1.7–7.7)
NEUTROPHILS ABSOLUTE: 6 K/UL (ref 1.7–7.7)
NEUTROPHILS ABSOLUTE: 6 K/UL (ref 1.7–7.7)
NEUTROPHILS ABSOLUTE: 6.5 K/UL (ref 1.7–7.7)
NEUTROPHILS ABSOLUTE: 6.8 K/UL (ref 1.7–7.7)
NEUTROPHILS ABSOLUTE: 7.3 K/UL (ref 1.7–7.7)
NEUTROPHILS ABSOLUTE: 7.4 K/UL (ref 1.7–7.7)
NEUTROPHILS ABSOLUTE: 8.8 K/UL (ref 1.7–7.7)
NEUTROPHILS RELATIVE PERCENT: 79.4 %
NEUTROPHILS RELATIVE PERCENT: 80.5 %
NEUTROPHILS RELATIVE PERCENT: 80.6 %
NEUTROPHILS RELATIVE PERCENT: 81.5 %
NEUTROPHILS RELATIVE PERCENT: 82 %
NEUTROPHILS RELATIVE PERCENT: 82.7 %
NEUTROPHILS RELATIVE PERCENT: 83 %
NEUTROPHILS RELATIVE PERCENT: 84.4 %
NEUTROPHILS RELATIVE PERCENT: 84.5 %
NEUTROPHILS RELATIVE PERCENT: 84.5 %
NEUTROPHILS RELATIVE PERCENT: 85.9 %
NEUTROPHILS RELATIVE PERCENT: 92 %
NITRITE, URINE: NEGATIVE
O2 CONTENT ARTERIAL: 14 ML/DL
O2 SAT, ARTERIAL: 92.5 %
O2 THERAPY: ABNORMAL
ORGANISM: ABNORMAL
PCO2 ARTERIAL: 59.4 MMHG (ref 35–45)
PDW BLD-RTO: 17.2 % (ref 12.4–15.4)
PDW BLD-RTO: 17.3 % (ref 12.4–15.4)
PDW BLD-RTO: 17.5 % (ref 12.4–15.4)
PDW BLD-RTO: 17.9 % (ref 12.4–15.4)
PDW BLD-RTO: 17.9 % (ref 12.4–15.4)
PDW BLD-RTO: 18.7 % (ref 12.4–15.4)
PDW BLD-RTO: 19.1 % (ref 12.4–15.4)
PDW BLD-RTO: 19.1 % (ref 12.4–15.4)
PDW BLD-RTO: 19.4 % (ref 12.4–15.4)
PDW BLD-RTO: 19.8 % (ref 12.4–15.4)
PH ARTERIAL: 7.21 (ref 7.35–7.45)
PH UA: 5 (ref 5–8)
PH UA: 5.5 (ref 5–8)
PH UA: 6.5 (ref 5–8)
PHOSPHORUS: 2.7 MG/DL (ref 2.5–4.9)
PHOSPHORUS: 2.7 MG/DL (ref 2.5–4.9)
PHOSPHORUS: 2.8 MG/DL (ref 2.5–4.9)
PHOSPHORUS: 3 MG/DL (ref 2.5–4.9)
PHOSPHORUS: 3.1 MG/DL (ref 2.5–4.9)
PHOSPHORUS: 3.2 MG/DL (ref 2.5–4.9)
PHOSPHORUS: 3.3 MG/DL (ref 2.5–4.9)
PHOSPHORUS: 3.5 MG/DL (ref 2.5–4.9)
PHOSPHORUS: 4.1 MG/DL (ref 2.5–4.9)
PHOSPHORUS: 4.1 MG/DL (ref 2.5–4.9)
PHOSPHORUS: 4.2 MG/DL (ref 2.5–4.9)
PLATELET # BLD: 121 K/UL (ref 135–450)
PLATELET # BLD: 121 K/UL (ref 135–450)
PLATELET # BLD: 130 K/UL (ref 135–450)
PLATELET # BLD: 131 K/UL (ref 135–450)
PLATELET # BLD: 133 K/UL (ref 135–450)
PLATELET # BLD: 134 K/UL (ref 135–450)
PLATELET # BLD: 136 K/UL (ref 135–450)
PLATELET # BLD: 136 K/UL (ref 135–450)
PLATELET # BLD: 142 K/UL (ref 135–450)
PLATELET # BLD: 162 K/UL (ref 135–450)
PLATELET # BLD: 163 K/UL (ref 135–450)
PLATELET # BLD: 168 K/UL (ref 135–450)
PMV BLD AUTO: 8.3 FL (ref 5–10.5)
PMV BLD AUTO: 8.4 FL (ref 5–10.5)
PMV BLD AUTO: 8.4 FL (ref 5–10.5)
PMV BLD AUTO: 8.5 FL (ref 5–10.5)
PMV BLD AUTO: 8.8 FL (ref 5–10.5)
PMV BLD AUTO: 8.9 FL (ref 5–10.5)
PMV BLD AUTO: 9 FL (ref 5–10.5)
PMV BLD AUTO: 9.1 FL (ref 5–10.5)
PMV BLD AUTO: 9.1 FL (ref 5–10.5)
PMV BLD AUTO: 9.4 FL (ref 5–10.5)
PO2 ARTERIAL: 75.7 MMHG (ref 75–108)
POTASSIUM REFLEX MAGNESIUM: 6 MMOL/L (ref 3.5–5.1)
POTASSIUM SERPL-SCNC: 3.5 MMOL/L (ref 3.5–5.1)
POTASSIUM SERPL-SCNC: 3.7 MMOL/L (ref 3.5–5.1)
POTASSIUM SERPL-SCNC: 3.8 MMOL/L (ref 3.5–5.1)
POTASSIUM SERPL-SCNC: 3.9 MMOL/L (ref 3.5–5.1)
POTASSIUM SERPL-SCNC: 4.2 MMOL/L (ref 3.5–5.1)
POTASSIUM SERPL-SCNC: 4.4 MMOL/L (ref 3.5–5.1)
POTASSIUM SERPL-SCNC: 4.7 MMOL/L (ref 3.5–5.1)
POTASSIUM SERPL-SCNC: 4.8 MMOL/L (ref 3.5–5.1)
POTASSIUM SERPL-SCNC: 4.9 MMOL/L (ref 3.5–5.1)
POTASSIUM SERPL-SCNC: 5 MMOL/L (ref 3.5–5.1)
PRO-BNP: ABNORMAL PG/ML (ref 0–449)
PRO-BNP: ABNORMAL PG/ML (ref 0–449)
PROCALCITONIN: 0.11 NG/ML (ref 0–0.15)
PROTEIN UA: 30 MG/DL
PROTEIN UA: NEGATIVE MG/DL
PROTEIN UA: NEGATIVE MG/DL
PROTHROMBIN TIME: 119.1 SEC (ref 9.8–13)
PROTHROMBIN TIME: 26.8 SEC (ref 9.8–13)
PROTHROMBIN TIME: 28.4 SEC (ref 9.8–13)
PROTHROMBIN TIME: 33.4 SEC (ref 9.8–13)
PROTHROMBIN TIME: 38.6 SEC (ref 9.8–13)
PROTHROMBIN TIME: 43 SEC (ref 9.8–13)
PROTHROMBIN TIME: 43.8 SEC (ref 9.8–13)
PROTHROMBIN TIME: 46 SEC (ref 9.8–13)
PROTHROMBIN TIME: 46.7 SEC (ref 9.8–13)
PROTHROMBIN TIME: 47.3 SEC (ref 9.8–13)
PROTHROMBIN TIME: 99.6 SEC (ref 9.8–13)
RBC # BLD: 3.46 M/UL (ref 4–5.2)
RBC # BLD: 3.46 M/UL (ref 4–5.2)
RBC # BLD: 3.57 M/UL (ref 4–5.2)
RBC # BLD: 3.59 M/UL (ref 4–5.2)
RBC # BLD: 3.64 M/UL (ref 4–5.2)
RBC # BLD: 3.68 M/UL (ref 4–5.2)
RBC # BLD: 3.71 M/UL (ref 4–5.2)
RBC # BLD: 3.72 M/UL (ref 4–5.2)
RBC # BLD: 3.73 M/UL (ref 4–5.2)
RBC # BLD: 3.83 M/UL (ref 4–5.2)
RBC # BLD: 3.89 M/UL (ref 4–5.2)
RBC # BLD: 3.94 M/UL (ref 4–5.2)
RBC # BLD: NORMAL 10*6/UL
RBC UA: 248 /HPF (ref 0–4)
RBC UA: 3 /HPF (ref 0–4)
RBC UA: ABNORMAL /HPF (ref 0–2)
REASON FOR REJECTION: NORMAL
REJECTED TEST: NORMAL
SODIUM BLD-SCNC: 140 MMOL/L (ref 136–145)
SODIUM BLD-SCNC: 142 MMOL/L (ref 136–145)
SODIUM BLD-SCNC: 143 MMOL/L (ref 136–145)
SODIUM BLD-SCNC: 144 MMOL/L (ref 136–145)
SODIUM BLD-SCNC: 144 MMOL/L (ref 136–145)
SODIUM BLD-SCNC: 145 MMOL/L (ref 136–145)
SODIUM BLD-SCNC: 145 MMOL/L (ref 136–145)
SODIUM BLD-SCNC: 148 MMOL/L (ref 136–145)
SODIUM BLD-SCNC: 151 MMOL/L (ref 136–145)
SPECIFIC GRAVITY UA: 1.01 (ref 1–1.03)
SPECIFIC GRAVITY UA: 1.01 (ref 1–1.03)
SPECIFIC GRAVITY UA: 1.02 (ref 1–1.03)
T4 FREE: 1.8 NG/DL (ref 0.9–1.8)
TCO2 ARTERIAL: 24.4 MMOL/L
TOTAL CK: 161 U/L (ref 26–192)
TOTAL PROTEIN: 6.4 G/DL (ref 6.4–8.2)
TOTAL PROTEIN: 6.6 G/DL (ref 6.4–8.2)
TROPONIN: 0.11 NG/ML
TROPONIN: 0.2 NG/ML
TROPONIN: 0.21 NG/ML
TROPONIN: 0.24 NG/ML
TROPONIN: 0.24 NG/ML
TSH REFLEX: 6.75 UIU/ML (ref 0.27–4.2)
URINE CULTURE, ROUTINE: ABNORMAL
URINE CULTURE, ROUTINE: ABNORMAL
URINE REFLEX TO CULTURE: ABNORMAL
URINE REFLEX TO CULTURE: YES
URINE REFLEX TO CULTURE: YES
URINE TYPE: ABNORMAL
UROBILINOGEN, URINE: 0.2 E.U./DL
UROBILINOGEN, URINE: 1 E.U./DL
UROBILINOGEN, URINE: 4 E.U./DL
VITAMIN B-12: >2000 PG/ML (ref 211–911)
WBC # BLD: 10.3 K/UL (ref 4–11)
WBC # BLD: 19.3 K/UL (ref 4–11)
WBC # BLD: 6.4 K/UL (ref 4–11)
WBC # BLD: 7.1 K/UL (ref 4–11)
WBC # BLD: 7.2 K/UL (ref 4–11)
WBC # BLD: 7.2 K/UL (ref 4–11)
WBC # BLD: 7.8 K/UL (ref 4–11)
WBC # BLD: 8.2 K/UL (ref 4–11)
WBC # BLD: 8.4 K/UL (ref 4–11)
WBC # BLD: 8.5 K/UL (ref 4–11)
WBC # BLD: 8.6 K/UL (ref 4–11)
WBC # BLD: 9 K/UL (ref 4–11)
WBC UA: 1 /HPF (ref 0–5)
WBC UA: 6 /HPF (ref 0–5)
WBC UA: ABNORMAL /HPF (ref 0–5)

## 2019-01-01 PROCEDURE — 85610 PROTHROMBIN TIME: CPT

## 2019-01-01 PROCEDURE — 6370000000 HC RX 637 (ALT 250 FOR IP): Performed by: STUDENT IN AN ORGANIZED HEALTH CARE EDUCATION/TRAINING PROGRAM

## 2019-01-01 PROCEDURE — 80053 COMPREHEN METABOLIC PANEL: CPT

## 2019-01-01 PROCEDURE — 92526 ORAL FUNCTION THERAPY: CPT

## 2019-01-01 PROCEDURE — 93010 ELECTROCARDIOGRAM REPORT: CPT | Performed by: INTERNAL MEDICINE

## 2019-01-01 PROCEDURE — 2500000003 HC RX 250 WO HCPCS: Performed by: STUDENT IN AN ORGANIZED HEALTH CARE EDUCATION/TRAINING PROGRAM

## 2019-01-01 PROCEDURE — 6370000000 HC RX 637 (ALT 250 FOR IP): Performed by: INTERNAL MEDICINE

## 2019-01-01 PROCEDURE — 2580000003 HC RX 258: Performed by: STUDENT IN AN ORGANIZED HEALTH CARE EDUCATION/TRAINING PROGRAM

## 2019-01-01 PROCEDURE — 85025 COMPLETE CBC W/AUTO DIFF WBC: CPT

## 2019-01-01 PROCEDURE — 2580000003 HC RX 258: Performed by: INTERNAL MEDICINE

## 2019-01-01 PROCEDURE — 97162 PT EVAL MOD COMPLEX 30 MIN: CPT

## 2019-01-01 PROCEDURE — 6360000002 HC RX W HCPCS: Performed by: EMERGENCY MEDICINE

## 2019-01-01 PROCEDURE — 71045 X-RAY EXAM CHEST 1 VIEW: CPT

## 2019-01-01 PROCEDURE — 6370000000 HC RX 637 (ALT 250 FOR IP): Performed by: EMERGENCY MEDICINE

## 2019-01-01 PROCEDURE — 6360000002 HC RX W HCPCS: Performed by: STUDENT IN AN ORGANIZED HEALTH CARE EDUCATION/TRAINING PROGRAM

## 2019-01-01 PROCEDURE — 6360000002 HC RX W HCPCS

## 2019-01-01 PROCEDURE — 96365 THER/PROPH/DIAG IV INF INIT: CPT

## 2019-01-01 PROCEDURE — 94640 AIRWAY INHALATION TREATMENT: CPT

## 2019-01-01 PROCEDURE — 97530 THERAPEUTIC ACTIVITIES: CPT

## 2019-01-01 PROCEDURE — 93005 ELECTROCARDIOGRAM TRACING: CPT | Performed by: STUDENT IN AN ORGANIZED HEALTH CARE EDUCATION/TRAINING PROGRAM

## 2019-01-01 PROCEDURE — 51798 US URINE CAPACITY MEASURE: CPT

## 2019-01-01 PROCEDURE — 83880 ASSAY OF NATRIURETIC PEPTIDE: CPT

## 2019-01-01 PROCEDURE — 81001 URINALYSIS AUTO W/SCOPE: CPT

## 2019-01-01 PROCEDURE — 82607 VITAMIN B-12: CPT

## 2019-01-01 PROCEDURE — 51701 INSERT BLADDER CATHETER: CPT

## 2019-01-01 PROCEDURE — 36415 COLL VENOUS BLD VENIPUNCTURE: CPT

## 2019-01-01 PROCEDURE — 1200000000 HC SEMI PRIVATE

## 2019-01-01 PROCEDURE — 84484 ASSAY OF TROPONIN QUANT: CPT

## 2019-01-01 PROCEDURE — 87086 URINE CULTURE/COLONY COUNT: CPT

## 2019-01-01 PROCEDURE — 80069 RENAL FUNCTION PANEL: CPT

## 2019-01-01 PROCEDURE — 6370000000 HC RX 637 (ALT 250 FOR IP): Performed by: NURSE PRACTITIONER

## 2019-01-01 PROCEDURE — 85730 THROMBOPLASTIN TIME PARTIAL: CPT

## 2019-01-01 PROCEDURE — 82803 BLOOD GASES ANY COMBINATION: CPT

## 2019-01-01 PROCEDURE — 51702 INSERT TEMP BLADDER CATH: CPT

## 2019-01-01 PROCEDURE — 99285 EMERGENCY DEPT VISIT HI MDM: CPT

## 2019-01-01 PROCEDURE — 96368 THER/DIAG CONCURRENT INF: CPT

## 2019-01-01 PROCEDURE — 87040 BLOOD CULTURE FOR BACTERIA: CPT

## 2019-01-01 PROCEDURE — 84439 ASSAY OF FREE THYROXINE: CPT

## 2019-01-01 PROCEDURE — 83605 ASSAY OF LACTIC ACID: CPT

## 2019-01-01 PROCEDURE — 82746 ASSAY OF FOLIC ACID SERUM: CPT

## 2019-01-01 PROCEDURE — 87186 SC STD MICRODIL/AGAR DIL: CPT

## 2019-01-01 PROCEDURE — 70450 CT HEAD/BRAIN W/O DYE: CPT

## 2019-01-01 PROCEDURE — 84443 ASSAY THYROID STIM HORMONE: CPT

## 2019-01-01 PROCEDURE — 96374 THER/PROPH/DIAG INJ IV PUSH: CPT

## 2019-01-01 PROCEDURE — 93005 ELECTROCARDIOGRAM TRACING: CPT | Performed by: PHYSICIAN ASSISTANT

## 2019-01-01 PROCEDURE — 2580000003 HC RX 258

## 2019-01-01 PROCEDURE — 36600 WITHDRAWAL OF ARTERIAL BLOOD: CPT

## 2019-01-01 PROCEDURE — 6360000002 HC RX W HCPCS: Performed by: INTERNAL MEDICINE

## 2019-01-01 PROCEDURE — 2500000003 HC RX 250 WO HCPCS: Performed by: EMERGENCY MEDICINE

## 2019-01-01 PROCEDURE — 94761 N-INVAS EAR/PLS OXIMETRY MLT: CPT

## 2019-01-01 PROCEDURE — 93005 ELECTROCARDIOGRAM TRACING: CPT | Performed by: EMERGENCY MEDICINE

## 2019-01-01 PROCEDURE — 99211 OFF/OP EST MAY X REQ PHY/QHP: CPT

## 2019-01-01 PROCEDURE — 97166 OT EVAL MOD COMPLEX 45 MIN: CPT

## 2019-01-01 PROCEDURE — 2580000003 HC RX 258: Performed by: EMERGENCY MEDICINE

## 2019-01-01 PROCEDURE — 87077 CULTURE AEROBIC IDENTIFY: CPT

## 2019-01-01 PROCEDURE — 2700000000 HC OXYGEN THERAPY PER DAY

## 2019-01-01 PROCEDURE — 82550 ASSAY OF CK (CPK): CPT

## 2019-01-01 PROCEDURE — 97535 SELF CARE MNGMENT TRAINING: CPT

## 2019-01-01 PROCEDURE — 96375 TX/PRO/DX INJ NEW DRUG ADDON: CPT

## 2019-01-01 PROCEDURE — 72125 CT NECK SPINE W/O DYE: CPT

## 2019-01-01 PROCEDURE — 92610 EVALUATE SWALLOWING FUNCTION: CPT

## 2019-01-01 PROCEDURE — 96366 THER/PROPH/DIAG IV INF ADDON: CPT

## 2019-01-01 PROCEDURE — 84145 PROCALCITONIN (PCT): CPT

## 2019-01-01 PROCEDURE — 6360000002 HC RX W HCPCS: Performed by: PHYSICIAN ASSISTANT

## 2019-01-01 RX ORDER — 0.9 % SODIUM CHLORIDE 0.9 %
5 VIAL (ML) INJECTION DAILY
Status: DISCONTINUED | OUTPATIENT
Start: 2019-01-01 | End: 2019-01-01

## 2019-01-01 RX ORDER — ATORVASTATIN CALCIUM 10 MG/1
TABLET, FILM COATED ORAL
Qty: 90 TABLET | Refills: 1 | Status: SHIPPED | OUTPATIENT
Start: 2019-01-01

## 2019-01-01 RX ORDER — LEVOFLOXACIN 5 MG/ML
250 INJECTION, SOLUTION INTRAVENOUS
Status: DISCONTINUED | OUTPATIENT
Start: 2019-06-04 | End: 2019-01-01

## 2019-01-01 RX ORDER — BETHANECHOL CHLORIDE 10 MG/1
5 TABLET ORAL 3 TIMES DAILY
Status: DISCONTINUED | OUTPATIENT
Start: 2019-01-01 | End: 2019-01-01 | Stop reason: HOSPADM

## 2019-01-01 RX ORDER — AMIODARONE HYDROCHLORIDE 200 MG/1
200 TABLET ORAL
Status: DISCONTINUED | OUTPATIENT
Start: 2019-01-01 | End: 2019-01-01 | Stop reason: HOSPADM

## 2019-01-01 RX ORDER — WARFARIN SODIUM 1 MG/1
1 TABLET ORAL
Status: DISCONTINUED | OUTPATIENT
Start: 2019-01-01 | End: 2019-01-01

## 2019-01-01 RX ORDER — FUROSEMIDE 10 MG/ML
20 INJECTION INTRAMUSCULAR; INTRAVENOUS DAILY
Status: DISCONTINUED | OUTPATIENT
Start: 2019-01-01 | End: 2019-01-01

## 2019-01-01 RX ORDER — TAMSULOSIN HYDROCHLORIDE 0.4 MG/1
0.4 CAPSULE ORAL DAILY
Status: DISCONTINUED | OUTPATIENT
Start: 2019-01-01 | End: 2019-01-01 | Stop reason: HOSPADM

## 2019-01-01 RX ORDER — TAMSULOSIN HYDROCHLORIDE 0.4 MG/1
0.4 CAPSULE ORAL DAILY
Qty: 30 CAPSULE | Refills: 3 | Status: SHIPPED | OUTPATIENT
Start: 2019-01-01

## 2019-01-01 RX ORDER — WARFARIN SODIUM 1 MG/1
1 TABLET ORAL
Status: DISCONTINUED | OUTPATIENT
Start: 2019-01-01 | End: 2019-01-01 | Stop reason: DRUGHIGH

## 2019-01-01 RX ORDER — ATORVASTATIN CALCIUM 10 MG/1
10 TABLET, FILM COATED ORAL DAILY
Status: DISCONTINUED | OUTPATIENT
Start: 2019-01-01 | End: 2019-01-01

## 2019-01-01 RX ORDER — WARFARIN SODIUM 1 MG/1
2 TABLET ORAL
Status: DISCONTINUED | OUTPATIENT
Start: 2019-01-01 | End: 2019-01-01 | Stop reason: DRUGHIGH

## 2019-01-01 RX ORDER — 0.9 % SODIUM CHLORIDE 0.9 %
250 INTRAVENOUS SOLUTION INTRAVENOUS ONCE
Status: COMPLETED | OUTPATIENT
Start: 2019-01-01 | End: 2019-01-01

## 2019-01-01 RX ORDER — SODIUM CHLORIDE 0.9 % (FLUSH) 0.9 %
10 SYRINGE (ML) INJECTION EVERY 12 HOURS SCHEDULED
Status: DISCONTINUED | OUTPATIENT
Start: 2019-01-01 | End: 2019-01-01 | Stop reason: HOSPADM

## 2019-01-01 RX ORDER — ONDANSETRON 2 MG/ML
4 INJECTION INTRAMUSCULAR; INTRAVENOUS EVERY 6 HOURS PRN
Status: DISCONTINUED | OUTPATIENT
Start: 2019-01-01 | End: 2019-01-01 | Stop reason: HOSPADM

## 2019-01-01 RX ORDER — SODIUM CHLORIDE 450 MG/100ML
INJECTION, SOLUTION INTRAVENOUS CONTINUOUS
Status: DISCONTINUED | OUTPATIENT
Start: 2019-01-01 | End: 2019-01-01

## 2019-01-01 RX ORDER — DOCUSATE SODIUM 100 MG/1
100 CAPSULE, LIQUID FILLED ORAL 2 TIMES DAILY
Status: DISCONTINUED | OUTPATIENT
Start: 2019-01-01 | End: 2019-01-01

## 2019-01-01 RX ORDER — FUROSEMIDE 10 MG/ML
40 INJECTION INTRAMUSCULAR; INTRAVENOUS ONCE
Status: COMPLETED | OUTPATIENT
Start: 2019-01-01 | End: 2019-01-01

## 2019-01-01 RX ORDER — FUROSEMIDE 10 MG/ML
20 INJECTION INTRAMUSCULAR; INTRAVENOUS ONCE
Status: COMPLETED | OUTPATIENT
Start: 2019-01-01 | End: 2019-01-01

## 2019-01-01 RX ORDER — BETHANECHOL CHLORIDE 5 MG
5 TABLET ORAL 3 TIMES DAILY
Qty: 90 TABLET | Refills: 3 | Status: SHIPPED | OUTPATIENT
Start: 2019-01-01

## 2019-01-01 RX ORDER — SODIUM CHLORIDE 9 MG/ML
INJECTION, SOLUTION INTRAVENOUS
Status: COMPLETED
Start: 2019-01-01 | End: 2019-01-01

## 2019-01-01 RX ORDER — QUETIAPINE FUMARATE 25 MG/1
12.5 TABLET, FILM COATED ORAL NIGHTLY
Status: DISCONTINUED | OUTPATIENT
Start: 2019-01-01 | End: 2019-01-01

## 2019-01-01 RX ORDER — SODIUM CHLORIDE 9 MG/ML
INJECTION, SOLUTION INTRAVENOUS
Status: DISPENSED
Start: 2019-01-01 | End: 2019-01-01

## 2019-01-01 RX ORDER — LEVOFLOXACIN 5 MG/ML
500 INJECTION, SOLUTION INTRAVENOUS ONCE
Status: DISCONTINUED | OUTPATIENT
Start: 2019-01-01 | End: 2019-01-01

## 2019-01-01 RX ORDER — LEVOTHYROXINE SODIUM ANHYDROUS 100 UG/5ML
20 INJECTION, POWDER, LYOPHILIZED, FOR SOLUTION INTRAVENOUS
Status: DISCONTINUED | OUTPATIENT
Start: 2019-01-01 | End: 2019-01-01

## 2019-01-01 RX ORDER — FUROSEMIDE 20 MG/1
20 TABLET ORAL DAILY
Status: DISCONTINUED | OUTPATIENT
Start: 2019-01-01 | End: 2019-01-01

## 2019-01-01 RX ORDER — DEXTROSE MONOHYDRATE 25 G/50ML
25 INJECTION, SOLUTION INTRAVENOUS ONCE
Status: COMPLETED | OUTPATIENT
Start: 2019-01-01 | End: 2019-01-01

## 2019-01-01 RX ORDER — MEGESTROL ACETATE 40 MG/ML
400 SUSPENSION ORAL DAILY
Qty: 240 ML | Refills: 1 | Status: SHIPPED | OUTPATIENT
Start: 2019-01-01

## 2019-01-01 RX ORDER — LORAZEPAM 2 MG/ML
1 INJECTION INTRAMUSCULAR ONCE
Status: COMPLETED | OUTPATIENT
Start: 2019-01-01 | End: 2019-01-01

## 2019-01-01 RX ORDER — LORAZEPAM 2 MG/ML
0.5 INJECTION INTRAMUSCULAR EVERY 6 HOURS PRN
Status: DISCONTINUED | OUTPATIENT
Start: 2019-01-01 | End: 2019-01-01 | Stop reason: HOSPADM

## 2019-01-01 RX ORDER — METOPROLOL TARTRATE 5 MG/5ML
2.5 INJECTION INTRAVENOUS EVERY 6 HOURS
Status: DISCONTINUED | OUTPATIENT
Start: 2019-01-01 | End: 2019-01-01

## 2019-01-01 RX ORDER — QUETIAPINE FUMARATE 25 MG/1
12.5 TABLET, FILM COATED ORAL 2 TIMES DAILY
Status: DISCONTINUED | OUTPATIENT
Start: 2019-01-01 | End: 2019-01-01

## 2019-01-01 RX ORDER — FUROSEMIDE 10 MG/ML
INJECTION INTRAMUSCULAR; INTRAVENOUS
Status: COMPLETED
Start: 2019-01-01 | End: 2019-01-01

## 2019-01-01 RX ORDER — LANOLIN ALCOHOL/MO/W.PET/CERES
CREAM (GRAM) TOPICAL 2 TIMES DAILY
Status: DISCONTINUED | OUTPATIENT
Start: 2019-01-01 | End: 2019-01-01 | Stop reason: HOSPADM

## 2019-01-01 RX ORDER — AMIODARONE HYDROCHLORIDE 200 MG/1
200 TABLET ORAL DAILY
Status: DISCONTINUED | OUTPATIENT
Start: 2019-01-01 | End: 2019-01-01

## 2019-01-01 RX ORDER — QUETIAPINE FUMARATE 25 MG/1
12.5 TABLET, FILM COATED ORAL NIGHTLY
Status: DISCONTINUED | OUTPATIENT
Start: 2019-01-01 | End: 2019-01-01 | Stop reason: HOSPADM

## 2019-01-01 RX ORDER — 0.9 % SODIUM CHLORIDE 0.9 %
250 INTRAVENOUS SOLUTION INTRAVENOUS ONCE
Status: DISCONTINUED | OUTPATIENT
Start: 2019-01-01 | End: 2019-01-01

## 2019-01-01 RX ORDER — FUROSEMIDE 40 MG/1
40 TABLET ORAL DAILY
Status: DISCONTINUED | OUTPATIENT
Start: 2019-01-01 | End: 2019-01-01 | Stop reason: HOSPADM

## 2019-01-01 RX ORDER — MEGESTROL ACETATE 40 MG/ML
400 SUSPENSION ORAL DAILY
Status: DISCONTINUED | OUTPATIENT
Start: 2019-01-01 | End: 2019-01-01 | Stop reason: HOSPADM

## 2019-01-01 RX ORDER — CEFDINIR 300 MG/1
300 CAPSULE ORAL DAILY
Status: DISCONTINUED | OUTPATIENT
Start: 2019-01-01 | End: 2019-01-01

## 2019-01-01 RX ORDER — CARVEDILOL 25 MG/1
25 TABLET ORAL 2 TIMES DAILY WITH MEALS
Status: DISCONTINUED | OUTPATIENT
Start: 2019-01-01 | End: 2019-01-01

## 2019-01-01 RX ORDER — CIPROFLOXACIN 500 MG/1
500 TABLET, FILM COATED ORAL
Status: DISCONTINUED | OUTPATIENT
Start: 2019-01-01 | End: 2019-01-01

## 2019-01-01 RX ORDER — MEGESTROL ACETATE 20 MG/1
20 TABLET ORAL DAILY
Status: DISCONTINUED | OUTPATIENT
Start: 2019-01-01 | End: 2019-01-01 | Stop reason: DRUGHIGH

## 2019-01-01 RX ORDER — LEVOFLOXACIN 5 MG/ML
500 INJECTION, SOLUTION INTRAVENOUS
Status: DISCONTINUED | OUTPATIENT
Start: 2019-01-01 | End: 2019-01-01 | Stop reason: DRUGHIGH

## 2019-01-01 RX ORDER — LEVOTHYROXINE SODIUM ANHYDROUS 100 UG/5ML
12.5 INJECTION, POWDER, LYOPHILIZED, FOR SOLUTION INTRAVENOUS
Status: DISCONTINUED | OUTPATIENT
Start: 2019-01-01 | End: 2019-01-01

## 2019-01-01 RX ORDER — LISINOPRIL 5 MG/1
5 TABLET ORAL DAILY
Status: DISCONTINUED | OUTPATIENT
Start: 2019-01-01 | End: 2019-01-01

## 2019-01-01 RX ORDER — LEVOTHYROXINE SODIUM 0.03 MG/1
25 TABLET ORAL
Status: DISCONTINUED | OUTPATIENT
Start: 2019-01-01 | End: 2019-01-01 | Stop reason: HOSPADM

## 2019-01-01 RX ORDER — IPRATROPIUM BROMIDE AND ALBUTEROL SULFATE 2.5; .5 MG/3ML; MG/3ML
1 SOLUTION RESPIRATORY (INHALATION) ONCE
Status: COMPLETED | OUTPATIENT
Start: 2019-01-01 | End: 2019-01-01

## 2019-01-01 RX ORDER — SODIUM CHLORIDE 0.9 % (FLUSH) 0.9 %
10 SYRINGE (ML) INJECTION PRN
Status: DISCONTINUED | OUTPATIENT
Start: 2019-01-01 | End: 2019-01-01 | Stop reason: HOSPADM

## 2019-01-01 RX ORDER — LEVOTHYROXINE SODIUM 0.03 MG/1
25 TABLET ORAL DAILY
Status: DISCONTINUED | OUTPATIENT
Start: 2019-01-01 | End: 2019-01-01

## 2019-01-01 RX ORDER — PHYTONADIONE 5 MG/1
2.5 TABLET ORAL ONCE
Status: DISCONTINUED | OUTPATIENT
Start: 2019-01-01 | End: 2019-01-01

## 2019-01-01 RX ORDER — LEVOTHYROXINE SODIUM 0.03 MG/1
25 TABLET ORAL
Status: DISCONTINUED | OUTPATIENT
Start: 2019-01-01 | End: 2019-01-01

## 2019-01-01 RX ORDER — MORPHINE SULFATE 2 MG/ML
2 INJECTION, SOLUTION INTRAMUSCULAR; INTRAVENOUS EVERY 4 HOURS PRN
Status: DISCONTINUED | OUTPATIENT
Start: 2019-01-01 | End: 2019-01-01 | Stop reason: HOSPADM

## 2019-01-01 RX ORDER — LEVOFLOXACIN 5 MG/ML
750 INJECTION, SOLUTION INTRAVENOUS
Status: DISCONTINUED | OUTPATIENT
Start: 2019-01-01 | End: 2019-01-01

## 2019-01-01 RX ORDER — UREA 10 %
2 LOTION (ML) TOPICAL ONCE
Status: DISCONTINUED | OUTPATIENT
Start: 2019-01-01 | End: 2019-01-01 | Stop reason: HOSPADM

## 2019-01-01 RX ORDER — FUROSEMIDE 40 MG/1
40 TABLET ORAL DAILY
Status: DISCONTINUED | OUTPATIENT
Start: 2019-01-01 | End: 2019-01-01

## 2019-01-01 RX ORDER — ATROPINE SULFATE 10 MG/ML
1 SOLUTION/ DROPS OPHTHALMIC 3 TIMES DAILY PRN
Status: DISCONTINUED | OUTPATIENT
Start: 2019-01-01 | End: 2019-01-01 | Stop reason: HOSPADM

## 2019-01-01 RX ORDER — CARVEDILOL 25 MG/1
25 TABLET ORAL 2 TIMES DAILY WITH MEALS
Status: DISCONTINUED | OUTPATIENT
Start: 2019-01-01 | End: 2019-01-01 | Stop reason: HOSPADM

## 2019-01-01 RX ADMIN — Medication 10 ML: at 10:36

## 2019-01-01 RX ADMIN — QUETIAPINE FUMARATE 12.5 MG: 25 TABLET ORAL at 21:41

## 2019-01-01 RX ADMIN — CARVEDILOL 25 MG: 25 TABLET, FILM COATED ORAL at 09:23

## 2019-01-01 RX ADMIN — QUETIAPINE FUMARATE 12.5 MG: 25 TABLET ORAL at 22:03

## 2019-01-01 RX ADMIN — Medication: at 21:42

## 2019-01-01 RX ADMIN — TAMSULOSIN HYDROCHLORIDE 0.4 MG: 0.4 CAPSULE ORAL at 09:23

## 2019-01-01 RX ADMIN — Medication 10 ML: at 20:57

## 2019-01-01 RX ADMIN — Medication 10 ML: at 10:00

## 2019-01-01 RX ADMIN — Medication: at 08:17

## 2019-01-01 RX ADMIN — BETHANECHOL CHLORIDE 5 MG: 10 TABLET ORAL at 09:47

## 2019-01-01 RX ADMIN — MEGESTROL ACETATE 400 MG: 40 SUSPENSION ORAL at 09:23

## 2019-01-01 RX ADMIN — AMIODARONE HYDROCHLORIDE 0.5 MG/MIN: 50 INJECTION, SOLUTION INTRAVENOUS at 20:24

## 2019-01-01 RX ADMIN — QUETIAPINE FUMARATE 12.5 MG: 25 TABLET ORAL at 21:34

## 2019-01-01 RX ADMIN — QUETIAPINE FUMARATE 12.5 MG: 25 TABLET ORAL at 08:17

## 2019-01-01 RX ADMIN — METOPROLOL TARTRATE 2.5 MG: 5 INJECTION, SOLUTION INTRAVENOUS at 16:50

## 2019-01-01 RX ADMIN — Medication 10 ML: at 10:19

## 2019-01-01 RX ADMIN — LEVOTHYROXINE SODIUM 25 MCG: 25 TABLET ORAL at 06:00

## 2019-01-01 RX ADMIN — BETHANECHOL CHLORIDE 5 MG: 10 TABLET ORAL at 10:32

## 2019-01-01 RX ADMIN — SODIUM CHLORIDE: 4.5 INJECTION, SOLUTION INTRAVENOUS at 04:13

## 2019-01-01 RX ADMIN — QUETIAPINE FUMARATE 12.5 MG: 25 TABLET ORAL at 22:00

## 2019-01-01 RX ADMIN — QUETIAPINE FUMARATE 12.5 MG: 25 TABLET ORAL at 07:50

## 2019-01-01 RX ADMIN — AMIODARONE HYDROCHLORIDE 200 MG: 200 TABLET ORAL at 11:09

## 2019-01-01 RX ADMIN — AMIODARONE HYDROCHLORIDE 200 MG: 200 TABLET ORAL at 07:50

## 2019-01-01 RX ADMIN — CARVEDILOL 25 MG: 25 TABLET, FILM COATED ORAL at 10:34

## 2019-01-01 RX ADMIN — FUROSEMIDE 40 MG: 40 TABLET ORAL at 08:17

## 2019-01-01 RX ADMIN — BETHANECHOL CHLORIDE 5 MG: 10 TABLET ORAL at 16:45

## 2019-01-01 RX ADMIN — INSULIN HUMAN 10 UNITS: 100 INJECTION, SOLUTION PARENTERAL at 05:20

## 2019-01-01 RX ADMIN — FUROSEMIDE 20 MG: 10 INJECTION, SOLUTION INTRAMUSCULAR; INTRAVENOUS at 11:25

## 2019-01-01 RX ADMIN — FUROSEMIDE 20 MG: 10 INJECTION, SOLUTION INTRAMUSCULAR; INTRAVENOUS at 10:32

## 2019-01-01 RX ADMIN — MEGESTROL ACETATE 400 MG: 40 SUSPENSION ORAL at 10:34

## 2019-01-01 RX ADMIN — ATROPINE SULFATE 1 DROP: 10 SOLUTION OPHTHALMIC at 22:52

## 2019-01-01 RX ADMIN — Medication 10 ML: at 08:33

## 2019-01-01 RX ADMIN — CARVEDILOL 25 MG: 25 TABLET, FILM COATED ORAL at 09:47

## 2019-01-01 RX ADMIN — CEFDINIR 300 MG: 300 CAPSULE ORAL at 08:17

## 2019-01-01 RX ADMIN — LEVOTHYROXINE SODIUM 25 MCG: 25 TABLET ORAL at 06:11

## 2019-01-01 RX ADMIN — WARFARIN SODIUM 1 MG: 1 TABLET ORAL at 18:56

## 2019-01-01 RX ADMIN — AMIODARONE HYDROCHLORIDE 0.5 MG/MIN: 50 INJECTION, SOLUTION INTRAVENOUS at 04:27

## 2019-01-01 RX ADMIN — BETHANECHOL CHLORIDE 5 MG: 10 TABLET ORAL at 15:03

## 2019-01-01 RX ADMIN — LEVOTHYROXINE SODIUM 25 MCG: 25 TABLET ORAL at 06:23

## 2019-01-01 RX ADMIN — Medication 10 ML: at 22:39

## 2019-01-01 RX ADMIN — SODIUM CHLORIDE, PRESERVATIVE FREE 10 ML: 5 INJECTION INTRAVENOUS at 08:23

## 2019-01-01 RX ADMIN — CARVEDILOL 25 MG: 25 TABLET, FILM COATED ORAL at 11:09

## 2019-01-01 RX ADMIN — WARFARIN SODIUM 2 MG: 1 TABLET ORAL at 18:40

## 2019-01-01 RX ADMIN — BETHANECHOL CHLORIDE 5 MG: 10 TABLET ORAL at 21:42

## 2019-01-01 RX ADMIN — LORAZEPAM 0.5 MG: 2 INJECTION INTRAMUSCULAR; INTRAVENOUS at 17:03

## 2019-01-01 RX ADMIN — SODIUM CHLORIDE: 0.9 INJECTION, SOLUTION INTRAVENOUS at 10:20

## 2019-01-01 RX ADMIN — METOPROLOL TARTRATE 2.5 MG: 5 INJECTION, SOLUTION INTRAVENOUS at 06:34

## 2019-01-01 RX ADMIN — Medication 10 ML: at 07:50

## 2019-01-01 RX ADMIN — METOPROLOL TARTRATE 2.5 MG: 5 INJECTION, SOLUTION INTRAVENOUS at 17:38

## 2019-01-01 RX ADMIN — BETHANECHOL CHLORIDE 5 MG: 10 TABLET ORAL at 09:23

## 2019-01-01 RX ADMIN — Medication 10 ML: at 09:23

## 2019-01-01 RX ADMIN — LORAZEPAM 1 MG: 2 INJECTION INTRAMUSCULAR; INTRAVENOUS at 23:20

## 2019-01-01 RX ADMIN — LEVOTHYROXINE SODIUM 25 MCG: 25 TABLET ORAL at 06:35

## 2019-01-01 RX ADMIN — CARVEDILOL 25 MG: 25 TABLET, FILM COATED ORAL at 22:38

## 2019-01-01 RX ADMIN — DEXTROSE MONOHYDRATE 25 G: 500 INJECTION PARENTERAL at 05:20

## 2019-01-01 RX ADMIN — Medication 10 ML: at 08:42

## 2019-01-01 RX ADMIN — MEGESTROL ACETATE 400 MG: 40 SUSPENSION ORAL at 09:46

## 2019-01-01 RX ADMIN — Medication: at 09:49

## 2019-01-01 RX ADMIN — CARVEDILOL 25 MG: 25 TABLET, FILM COATED ORAL at 08:17

## 2019-01-01 RX ADMIN — FUROSEMIDE 40 MG: 10 INJECTION INTRAMUSCULAR; INTRAVENOUS at 08:23

## 2019-01-01 RX ADMIN — CARVEDILOL 25 MG: 25 TABLET, FILM COATED ORAL at 16:45

## 2019-01-01 RX ADMIN — CARVEDILOL 25 MG: 25 TABLET, FILM COATED ORAL at 17:52

## 2019-01-01 RX ADMIN — Medication: at 10:35

## 2019-01-01 RX ADMIN — Medication: at 09:23

## 2019-01-01 RX ADMIN — QUETIAPINE FUMARATE 12.5 MG: 25 TABLET ORAL at 20:56

## 2019-01-01 RX ADMIN — CARVEDILOL 25 MG: 25 TABLET, FILM COATED ORAL at 08:41

## 2019-01-01 RX ADMIN — CEFDINIR 300 MG: 300 CAPSULE ORAL at 20:06

## 2019-01-01 RX ADMIN — CALCIUM GLUCONATE 1 G: 94 INJECTION, SOLUTION INTRAVENOUS at 05:48

## 2019-01-01 RX ADMIN — CARVEDILOL 25 MG: 25 TABLET, FILM COATED ORAL at 18:40

## 2019-01-01 RX ADMIN — BETHANECHOL CHLORIDE 5 MG: 10 TABLET ORAL at 20:56

## 2019-01-01 RX ADMIN — TAMSULOSIN HYDROCHLORIDE 0.4 MG: 0.4 CAPSULE ORAL at 10:34

## 2019-01-01 RX ADMIN — METOPROLOL TARTRATE 2.5 MG: 5 INJECTION, SOLUTION INTRAVENOUS at 11:35

## 2019-01-01 RX ADMIN — Medication: at 22:00

## 2019-01-01 RX ADMIN — CEFDINIR 300 MG: 300 CAPSULE ORAL at 10:36

## 2019-01-01 RX ADMIN — BETHANECHOL CHLORIDE 5 MG: 10 TABLET ORAL at 17:52

## 2019-01-01 RX ADMIN — Medication 10 ML: at 09:31

## 2019-01-01 RX ADMIN — AMIODARONE HYDROCHLORIDE 200 MG: 200 TABLET ORAL at 09:23

## 2019-01-01 RX ADMIN — METOPROLOL TARTRATE 2.5 MG: 5 INJECTION, SOLUTION INTRAVENOUS at 10:32

## 2019-01-01 RX ADMIN — CARVEDILOL 25 MG: 25 TABLET, FILM COATED ORAL at 07:49

## 2019-01-01 RX ADMIN — BETHANECHOL CHLORIDE 5 MG: 10 TABLET ORAL at 10:35

## 2019-01-01 RX ADMIN — METOPROLOL TARTRATE 2.5 MG: 5 INJECTION, SOLUTION INTRAVENOUS at 23:11

## 2019-01-01 RX ADMIN — Medication: at 20:56

## 2019-01-01 RX ADMIN — HYDROMORPHONE HYDROCHLORIDE 0.5 MG: 1 INJECTION, SOLUTION INTRAMUSCULAR; INTRAVENOUS; SUBCUTANEOUS at 03:35

## 2019-01-01 RX ADMIN — METOPROLOL TARTRATE 2.5 MG: 5 INJECTION, SOLUTION INTRAVENOUS at 01:28

## 2019-01-01 RX ADMIN — AMIODARONE HYDROCHLORIDE 0.5 MG/MIN: 50 INJECTION, SOLUTION INTRAVENOUS at 12:11

## 2019-01-01 RX ADMIN — Medication 10 ML: at 23:11

## 2019-01-01 RX ADMIN — SODIUM CHLORIDE: 4.5 INJECTION, SOLUTION INTRAVENOUS at 18:01

## 2019-01-01 RX ADMIN — SODIUM CHLORIDE, PRESERVATIVE FREE 10 ML: 5 INJECTION INTRAVENOUS at 22:53

## 2019-01-01 RX ADMIN — TAMSULOSIN HYDROCHLORIDE 0.4 MG: 0.4 CAPSULE ORAL at 09:47

## 2019-01-01 RX ADMIN — LORAZEPAM 0.5 MG: 2 INJECTION INTRAMUSCULAR; INTRAVENOUS at 10:23

## 2019-01-01 RX ADMIN — Medication: at 05:48

## 2019-01-01 RX ADMIN — LEVOTHYROXINE SODIUM 25 MCG: 25 TABLET ORAL at 05:49

## 2019-01-01 RX ADMIN — BETHANECHOL CHLORIDE 5 MG: 10 TABLET ORAL at 21:19

## 2019-01-01 RX ADMIN — MORPHINE SULFATE 2 MG: 2 INJECTION, SOLUTION INTRAMUSCULAR; INTRAVENOUS at 22:52

## 2019-01-01 RX ADMIN — FUROSEMIDE 40 MG: 10 INJECTION, SOLUTION INTRAMUSCULAR; INTRAVENOUS at 08:23

## 2019-01-01 RX ADMIN — MORPHINE SULFATE 2 MG: 2 INJECTION, SOLUTION INTRAMUSCULAR; INTRAVENOUS at 15:20

## 2019-01-01 RX ADMIN — TAMSULOSIN HYDROCHLORIDE 0.4 MG: 0.4 CAPSULE ORAL at 16:46

## 2019-01-01 RX ADMIN — METOPROLOL TARTRATE 2.5 MG: 5 INJECTION, SOLUTION INTRAVENOUS at 05:40

## 2019-01-01 RX ADMIN — AMIODARONE HYDROCHLORIDE 200 MG: 200 TABLET ORAL at 09:47

## 2019-01-01 RX ADMIN — Medication: at 22:39

## 2019-01-01 RX ADMIN — Medication 10 ML: at 21:03

## 2019-01-01 RX ADMIN — CARVEDILOL 25 MG: 25 TABLET, FILM COATED ORAL at 22:03

## 2019-01-01 RX ADMIN — CARVEDILOL 25 MG: 25 TABLET, FILM COATED ORAL at 18:56

## 2019-01-01 RX ADMIN — Medication 10 ML: at 21:20

## 2019-01-01 RX ADMIN — CEFDINIR 300 MG: 300 CAPSULE ORAL at 10:29

## 2019-01-01 RX ADMIN — FUROSEMIDE 40 MG: 40 TABLET ORAL at 07:50

## 2019-01-01 RX ADMIN — IPRATROPIUM BROMIDE AND ALBUTEROL SULFATE 1 AMPULE: .5; 3 SOLUTION RESPIRATORY (INHALATION) at 03:41

## 2019-01-01 RX ADMIN — PHYTONADIONE 2 MG: 10 INJECTION, EMULSION INTRAMUSCULAR; INTRAVENOUS; SUBCUTANEOUS at 11:38

## 2019-01-01 RX ADMIN — LEVOTHYROXINE SODIUM 25 MCG: 25 TABLET ORAL at 06:32

## 2019-01-01 RX ADMIN — MEGESTROL ACETATE 400 MG: 40 SUSPENSION ORAL at 16:45

## 2019-01-01 RX ADMIN — QUETIAPINE FUMARATE 12.5 MG: 25 TABLET ORAL at 21:19

## 2019-01-01 RX ADMIN — BETHANECHOL CHLORIDE 5 MG: 10 TABLET ORAL at 13:49

## 2019-01-01 RX ADMIN — METOPROLOL TARTRATE 2.5 MG: 5 INJECTION, SOLUTION INTRAVENOUS at 11:42

## 2019-01-01 RX ADMIN — AMIODARONE HYDROCHLORIDE 200 MG: 200 TABLET ORAL at 08:17

## 2019-01-01 RX ADMIN — SODIUM CHLORIDE 250 ML: 9 INJECTION, SOLUTION INTRAVENOUS at 03:30

## 2019-01-01 RX ADMIN — FUROSEMIDE 40 MG: 40 TABLET ORAL at 11:09

## 2019-01-01 RX ADMIN — CEFDINIR 300 MG: 300 CAPSULE ORAL at 15:03

## 2019-01-01 RX ADMIN — HYDROMORPHONE HYDROCHLORIDE 0.5 MG: 1 INJECTION, SOLUTION INTRAMUSCULAR; INTRAVENOUS; SUBCUTANEOUS at 07:46

## 2019-01-01 RX ADMIN — Medication: at 08:41

## 2019-01-01 ASSESSMENT — PAIN SCALES - PAIN ASSESSMENT IN ADVANCED DEMENTIA (PAINAD)
BREATHING: 0
BREATHING: 0
CONSOLABILITY: 0
BREATHING: 1
BODYLANGUAGE: 0
FACIALEXPRESSION: 0
TOTALSCORE: 0
BODYLANGUAGE: 0
FACIALEXPRESSION: 0
BODYLANGUAGE: 0
NEGVOCALIZATION: 0
NEGVOCALIZATION: 0
BREATHING: 0
TOTALSCORE: 0
NEGVOCALIZATION: 0
TOTALSCORE: 0
CONSOLABILITY: 0
CONSOLABILITY: 0
FACIALEXPRESSION: 1
TOTALSCORE: 0
BODYLANGUAGE: 1
BREATHING: 0
BREATHING: 0
NEGVOCALIZATION: 0
TOTALSCORE: 3
CONSOLABILITY: 0
NEGVOCALIZATION: 0
TOTALSCORE: 1
CONSOLABILITY: 1
BODYLANGUAGE: 0
BREATHING: 0
FACIALEXPRESSION: 0
TOTALSCORE: 2
TOTALSCORE: 0
BODYLANGUAGE: 0
CONSOLABILITY: 0
BREATHING: 0
NEGVOCALIZATION: 0
CONSOLABILITY: 0
TOTALSCORE: 0
NEGVOCALIZATION: 1
TOTALSCORE: 2
FACIALEXPRESSION: 0
CONSOLABILITY: 0
CONSOLABILITY: 0
BREATHING: 0
CONSOLABILITY: 0
BODYLANGUAGE: 0
CONSOLABILITY: 0
FACIALEXPRESSION: 0
FACIALEXPRESSION: 0
FACIALEXPRESSION: 1
TOTALSCORE: 0
BREATHING: 1
BREATHING: 0
NEGVOCALIZATION: 0
TOTALSCORE: 9
BREATHING: 0
BODYLANGUAGE: 0
BODYLANGUAGE: 0
TOTALSCORE: 0
TOTALSCORE: 0
CONSOLABILITY: 0
BODYLANGUAGE: 1
TOTALSCORE: 0
FACIALEXPRESSION: 0
FACIALEXPRESSION: 0
TOTALSCORE: 0
FACIALEXPRESSION: 0
NEGVOCALIZATION: 0
BREATHING: 0
NEGVOCALIZATION: 0
FACIALEXPRESSION: 0
CONSOLABILITY: 1
CONSOLABILITY: 0
BODYLANGUAGE: 0
NEGVOCALIZATION: 0
BODYLANGUAGE: 0
FACIALEXPRESSION: 0
FACIALEXPRESSION: 0
CONSOLABILITY: 1
BREATHING: 0
NEGVOCALIZATION: 0
FACIALEXPRESSION: 0
NEGVOCALIZATION: 1
NEGVOCALIZATION: 1
BODYLANGUAGE: 0
NEGVOCALIZATION: 1
NEGVOCALIZATION: 0
FACIALEXPRESSION: 0
TOTALSCORE: 0
NEGVOCALIZATION: 0
BODYLANGUAGE: 1
BODYLANGUAGE: 0
TOTALSCORE: 0
NEGVOCALIZATION: 0
FACIALEXPRESSION: 0
TOTALSCORE: 0
FACIALEXPRESSION: 2
TOTALSCORE: 2
CONSOLABILITY: 0
NEGVOCALIZATION: 0
TOTALSCORE: 0
CONSOLABILITY: 0
CONSOLABILITY: 0
BODYLANGUAGE: 0
BODYLANGUAGE: 0
NEGVOCALIZATION: 0
CONSOLABILITY: 0
BREATHING: 0
FACIALEXPRESSION: 1
FACIALEXPRESSION: 0
CONSOLABILITY: 0
NEGVOCALIZATION: 0
BODYLANGUAGE: 0
TOTALSCORE: 2
CONSOLABILITY: 0
FACIALEXPRESSION: 0
BODYLANGUAGE: 1
CONSOLABILITY: 0
TOTALSCORE: 3
NEGVOCALIZATION: 1
TOTALSCORE: 0
FACIALEXPRESSION: 0
BREATHING: 0
TOTALSCORE: 4
TOTALSCORE: 0
BODYLANGUAGE: 0
BREATHING: 0
NEGVOCALIZATION: 0
CONSOLABILITY: 2
NEGVOCALIZATION: 0
BODYLANGUAGE: 0
NEGVOCALIZATION: 1
CONSOLABILITY: 0
BREATHING: 0
CONSOLABILITY: 0
FACIALEXPRESSION: 0
CONSOLABILITY: 0
TOTALSCORE: 4
NEGVOCALIZATION: 0
BREATHING: 0
NEGVOCALIZATION: 2
BREATHING: 0
BREATHING: 0
BODYLANGUAGE: 1
FACIALEXPRESSION: 0
FACIALEXPRESSION: 0
CONSOLABILITY: 1
BREATHING: 0
TOTALSCORE: 0
TOTALSCORE: 0
BREATHING: 0
CONSOLABILITY: 1
CONSOLABILITY: 0
BREATHING: 0
BODYLANGUAGE: 0
BODYLANGUAGE: 0
BREATHING: 0
BODYLANGUAGE: 1
CONSOLABILITY: 0
BODYLANGUAGE: 1
NEGVOCALIZATION: 1
TOTALSCORE: 0
BREATHING: 1
BREATHING: 0
BODYLANGUAGE: 0
BREATHING: 1
BODYLANGUAGE: 0
BREATHING: 0
NEGVOCALIZATION: 1
BODYLANGUAGE: 0
FACIALEXPRESSION: 0
FACIALEXPRESSION: 0
NEGVOCALIZATION: 0
TOTALSCORE: 3
FACIALEXPRESSION: 0
BODYLANGUAGE: 0
FACIALEXPRESSION: 0
NEGVOCALIZATION: 0
TOTALSCORE: 0
FACIALEXPRESSION: 0
CONSOLABILITY: 0
TOTALSCORE: 0
TOTALSCORE: 0
CONSOLABILITY: 0
FACIALEXPRESSION: 0
BREATHING: 0
CONSOLABILITY: 0
NEGVOCALIZATION: 0
BREATHING: 0
BODYLANGUAGE: 0
BODYLANGUAGE: 2
FACIALEXPRESSION: 0
NEGVOCALIZATION: 0
NEGVOCALIZATION: 0
CONSOLABILITY: 0
BREATHING: 0

## 2019-01-01 ASSESSMENT — PAIN SCALES - GENERAL
PAINLEVEL_OUTOF10: 0
PAINLEVEL_OUTOF10: 4
PAINLEVEL_OUTOF10: 0

## 2019-01-01 ASSESSMENT — PAIN DESCRIPTION - PAIN TYPE
TYPE: ACUTE PAIN
TYPE: ACUTE PAIN

## 2019-01-01 ASSESSMENT — ENCOUNTER SYMPTOMS
FACIAL SWELLING: 0
SHORTNESS OF BREATH: 0
SORE THROAT: 0
BACK PAIN: 0
CHOKING: 0
ABDOMINAL PAIN: 0
VOMITING: 0
APNEA: 0
EYE DISCHARGE: 0
NAUSEA: 0
EYE REDNESS: 0

## 2019-01-01 ASSESSMENT — PAIN DESCRIPTION - PROGRESSION
CLINICAL_PROGRESSION: RESOLVED
CLINICAL_PROGRESSION: NOT CHANGED
CLINICAL_PROGRESSION: NOT CHANGED

## 2019-01-01 ASSESSMENT — PAIN SCALES - WONG BAKER
WONGBAKER_NUMERICALRESPONSE: 4
WONGBAKER_NUMERICALRESPONSE: 2
WONGBAKER_NUMERICALRESPONSE: 0
WONGBAKER_NUMERICALRESPONSE: 2

## 2019-01-01 ASSESSMENT — PAIN DESCRIPTION - DESCRIPTORS
DESCRIPTORS: PATIENT UNABLE TO DESCRIBE
DESCRIPTORS: DISCOMFORT

## 2019-01-01 ASSESSMENT — PAIN - FUNCTIONAL ASSESSMENT: PAIN_FUNCTIONAL_ASSESSMENT: PREVENTS OR INTERFERES SOME ACTIVE ACTIVITIES AND ADLS

## 2019-01-01 ASSESSMENT — PAIN DESCRIPTION - FREQUENCY
FREQUENCY: CONTINUOUS
FREQUENCY: CONTINUOUS

## 2019-01-01 ASSESSMENT — PAIN DESCRIPTION - ORIENTATION
ORIENTATION: POSTERIOR
ORIENTATION: POSTERIOR

## 2019-01-01 ASSESSMENT — PAIN DESCRIPTION - LOCATION
LOCATION: NECK
LOCATION: ARM;NECK

## 2019-01-01 ASSESSMENT — PAIN DESCRIPTION - ONSET
ONSET: ON-GOING
ONSET: ON-GOING

## 2019-04-15 NOTE — TELEPHONE ENCOUNTER
Left message for patient to please call back to reschedule missed appointment.     Honorio Romero, PharmD, BCPS  4/15/2019  1:23 PM      [address] [facility]  Anticoagulation Service / Heart Failure  [phone]

## 2019-05-04 PROBLEM — S12.000A TRAUMATIC CLOSED FRACTURE OF C1 VERTEBRA WITH MINIMAL DISPLACEMENT, INITIAL ENCOUNTER (HCC): Status: ACTIVE | Noted: 2019-01-01

## 2019-05-05 NOTE — PROGRESS NOTES
Perfect serve sent to resident on call to ask about having pacemaker interrogated. Resident is Dr. Geoff Vasquez.  Opal Hays

## 2019-05-05 NOTE — ED NOTES
Bedside report given to [facility].       Marleen Marroquin RN  05/04/19 5919

## 2019-05-05 NOTE — PROGRESS NOTES
PGY-1 Resident Progress Note  [facility]    Admit Date: 5/5/2019       CC: C1 fracture    Overnight Events:  No acute events overnight. Pt seen at bedside and reactive to pain (grimaces and lateralizes to pain) but does not follow commands and non-verbal. Pt waiting for BiV pacemaker MRI compatibility. Family Hx:   Family History   Problem Relation Age of Onset    Other Mother     Other Father        Scheduled Medications:    sodium chloride flush  10 mL Intravenous 2 times per day    amiodarone  200 mg Oral Daily    atorvastatin  10 mg Oral Daily    carvedilol  25 mg Oral BID WC    furosemide  20 mg Oral Daily    [START ON 5/11/2019] levothyroxine  12.5 mcg Intravenous QAM AC    And    [START ON 5/11/2019] sodium chloride (PF)  5 mL Intravenous Daily      PRN Medications: sodium chloride flush, magnesium hydroxide, ondansetron, perflutren lipid microspheres  Diet: Diet NPO Effective Now    Continuous Infusions:    PHYSICAL EXAM:  /72   Pulse 95   Temp 97.6 °F (36.4 °C) (Axillary)   Resp 16   SpO2 94%   No results for input(s): POCGLU in the last 72 hours. Intake/Output Summary (Last 24 hours) at 5/5/2019 0823  Last data filed at 5/5/2019 0636  Gross per 24 hour   Intake 0 ml   Output 350 ml   Net -350 ml       Physical Exam   Constitutional: No distress. HENT:   Head: Atraumatic. Right Ear: External ear normal.   Left Ear: External ear normal.   Eyes: Right eye exhibits no discharge. Left eye exhibits no discharge. No scleral icterus. Neck: Neck supple. No tracheal deviation present. No thyromegaly present. C collar in place   Cardiovascular: Normal rate. Exam reveals no friction rub. No murmur heard. irreg irreg HR   Pulmonary/Chest: Effort normal and breath sounds normal. No respiratory distress. Abdominal: Soft. Bowel sounds are normal. She exhibits no distension. There is no tenderness. Musculoskeletal: She exhibits no edema, tenderness or deformity.    Neurological: Unable to assess   Skin: Capillary refill takes less than 2 seconds. No rash noted. She is not diaphoretic. No erythema. No pallor. LABS:  Recent Labs     05/04/19 2129 05/05/19 0618   WBC 7.1 10.3   HGB 12.5 11.6*   HCT 38.6 36.2    162                                                                    Recent Labs     05/04/19 2129 05/05/19 0618    142   K 4.9 4.7    103   CO2 27 24   BUN 50* 56*   CREATININE 2.3* 2.7*   GLUCOSE 95 86     Recent Labs     05/04/19 2129   AST 35   ALT 24   BILITOT 1.1*   ALKPHOS 108     Recent Labs     05/04/19 2129 05/05/19  0207   TROPONINI 0.24* 0.24*     No results for input(s): BNP in the last 72 hours. No results for input(s): CHOL, HDL in the last 72 hours. Invalid input(s): LDLCALCU  Recent Labs     05/05/19  0208   INR 8.74*       Imaging:          CT HEAD WO CONTRAST   Final Result     No acute hemorrhage, hydrocephalus, or mass effect. Assessment & Plan: Verónica Rosas is a 80 y.o. female with PMH of AF (on warfarin), HFrEF (30-35%) with Biventricular ICD and HTN who presented to [facility] after an unwitnessed fall and admitted for a C1 fracture. C1 fracture in setting of unwitnessed fall   CT C-spine showed nondisplaced dens fracture of C1. CT head did not show any acute findings. - neurosurgery consulted  - continue C-collar  - q4h neuro checks  - NPO in setting of AMS 2/2 ativan   - BiV Pacemaker rep (Milo Amanda @ [address] [facility] [phone]) contacted  -- device not MRI compatible per [email or web address].[email or web address]/ and device rep  -- Model Accent RF pacemaker PM 3210  -- Lead model 5076 (Medtronic) and Lead model 1258T/86     AMS  Likely 2/2 to ativan. - hold ativan    KENDRA on CKD  Baseline creatinine around 1.6. Creatinine on presentation was 2.3. Received 250 cc bolus. - f/u BMP      Elevated Troponin   - continue to trend troponin downtrending     HFrEF - not in exacerbation   ECHO in 2015 showed EF 30-35%. Patient has BiV ICD placed in 2013. At home on lasix 40 mg daily 5 times weekly. - coreg 25 mg BID  - lasix 20 mg IV 1x     B/L LE edema with multiple bullous-like lesions and possible surrounding cellulitis   Patient appears to have severe LE edema with chronic venous stasis. Multiple bullous-like lesions with eruptions and surrounding erythema concerning for cellulitis. - wound care  - lasix as mentioned above      AF on warfarin   - amiodarone 200 mg 5 times weekly   - INR 8.74  -- hold warfarin and give low-dose (2.5mg) oral vit K since neursx intervention anticipated     CAD  - continue statin      HTN  - continue medications listed above      Hypothyroidism  - continue IV synthroid when appropriate   - f/u TSH w/reflex       Code Status: Full Code  FEN: IVF: none; Diet NPO Effective Now  PPX: Hold AC for now as INR supratheraputic  DISPO: GMF      This patient will be discussed with attending, Maria A Pat MD.    Becky Fregoso M.D.    Internal Medicine Resident, PGY-1  Contact via [employer]  5/5/2019, 8:23 AM

## 2019-05-05 NOTE — H&P
Internal Medicine PGY-1 Resident History & Physical      PCP: Marian Brown MD    Date of Admission: 5/5/2019    Chief Complaint:  C1 fracture      History Of Present Illness:      80 y.o. female with PMH of AF (on warfarin), HFrEF (30-35%) with Biventricular ICD and HTN who presented to [facility] after an unwitnessed fall. Patient did not appear to have any LOC. Patient did not have any pain or neuro deficits upon workup at [facility] ED. Patient was found to have nondisplaced fracture involving base of the dens of C1 on CT of C-spine. Patient was transferred to [facility] for further workup and management of C1 fracture. Per nurse, patient was alert and oriented before Ativan was given at [address] was given before transfer because patient became agitated. On exam patient was unresponsive to verbal commands but did move all four extremities spontaneously and was responsive to pain. Patient remains in [address]. Hemodynamically stable on exam.     Past Medical History:          Diagnosis Date    Atrial fibrillation ([address]     cardioversion 4/2010 and 5/2011    Cardiomyopathy [facility])     CHF (congestive heart failure) ([facility] [address]     Heart murmur     Hyperlipidemia     Hypertension     Mitral insufficiency     Pacemaker 12/23/13    original PM 11/18/11; 12/23/13 generator replace and biventricular lead placement       Past Surgical History:          Procedure Laterality Date    CORONARY ANGIOPLASTY      DIAGNOSTIC CARDIAC CATH LAB PROCEDURE      EYE SURGERY Bilateral     cataracts    JOINT REPLACEMENT      MITRAL VALVE SURGERY      PACEMAKER PLACEMENT  11/18/11    PACEMAKER PLACEMENT  12/23/13    generator replacement, biventricular lead placement; programmed to DDDR with lower rate 60 and upper tracking of 120    TOTAL HIP ARTHROPLASTY         Medications Prior to Admission:      Prior to Admission medications    Medication Sig Start Date End Date Taking?  Authorizing Provider   atorvastatin (LIPITOR) 10 MG tablet TAKE ONE TABLET BY MOUTH DAILY 2/1/19  Yes Alex Gentile MD   carvedilol (COREG) 25 MG tablet TAKE ONE TABLET BY MOUTH TWICE A DAY WITH MEALS 9/27/18  Yes Alex Gentile MD   furosemide (LASIX) 40 MG tablet TAKE ONE TABLET BY MOUTH FIVE TIMES WEEKLY 9/27/18  Yes Alex Gentile MD   amiodarone (CORDARONE) 200 MG tablet TAKE ONE TABLET BY MOUTH FIVE TIMES WEEKLY 9/27/18  Yes Alex Gentile MD   lisinopril (PRINIVIL;ZESTRIL) 5 MG tablet TAKE ONE TABLET BY MOUTH DAILY 9/27/18  Yes Alex Gentile MD   levothyroxine (SYNTHROID) 25 MCG tablet TAKE ONE TABLET BY MOUTH DAILY 9/27/18  Yes Alex Gentile MD   warfarin (COUMADIN) 2 MG tablet TAKE ONE TO TWO TABLETS BY MOUTH DAILY  Patient taking differently: Take 2 mg by mouth daily Except 1mg every Tuesday and Thursday 9/27/18  Yes Alex Gentile MD   acetaminophen (AMINOFEN) 325 MG tablet Take 2 tablets by mouth every 6 hours as needed for Pain 9/4/17  Yes Danisha Doyle PA-C   Polysaccharide Iron Complex (PROFE) 391.3 (180 FE) MG CAPS Take 180 mg by mouth daily. 4/22/14  Yes Muriel Herbert MD       Allergies:  Patient has no known allergies. Social History:      TOBACCO:   reports that she has never smoked. She has never used smokeless tobacco.  ETOH:   reports that she does not drink alcohol. Family History:         Problem Relation Age of Onset    Other Mother     Other Father        REVIEW OF SYSTEMS: Pertinent positives as noted in the HPI. All other systems reviewed and negative. ROS: Review of Systems   Unable to perform ROS: Mental status change       PHYSICAL EXAM PERFORMED:    /84   Pulse 95   Temp 98.9 °F (37.2 °C) (Oral)   Resp 16   SpO2 98%     General appearance:  No apparent distress. Stuporous. HEENT:  Normal cephalic, atraumatic without obvious deformity. C-collar. Neck: Supple. No jugular venous distention. Trachea midline. Respiratory:  Normal respiratory effort. Clear to auscultation bilaterally.    Cardiovascular:  Regular rate and rhythm. Normal heart sounds. Abdomen: Soft, non-distended with normal bowel sounds. Musculoskeletal: (+) pitting edema in b/l LE. No deformities. Skin: (+) multiple bullous-like lesions on LE likely chronic venous stasis   Neurologic:  Moves all four extremities. Responsive to pain. Labs:     Recent Labs     05/04/19 2129   WBC 7.1   HGB 12.5   HCT 38.6        Recent Labs     05/04/19 2129      K 4.9      CO2 27   BUN 50*   CREATININE 2.3*   CALCIUM 8.9     Recent Labs     05/04/19 2129   AST 35   ALT 24   BILITOT 1.1*   ALKPHOS 108     No results for input(s): INR in the last 72 hours. Recent Labs     05/04/19 2129   CKTOTAL 161   TROPONINI 0.24*       Urinalysis:      Lab Results   Component Value Date    NITRU Negative 05/05/2019    WBCUA 1 05/05/2019    BACTERIA 1+ 03/25/2014    RBCUA 3 05/05/2019    BLOODU Negative 05/05/2019    SPECGRAV 1.019 05/05/2019    GLUCOSEU Negative 05/05/2019       Radiology:       MRI CERVICAL SPINE WO CONTRAST    (Results Pending)   VL DUP CAROTID BILATERAL    (Results Pending)       ASSESSMENT & PLAN:    Active Hospital Problems    Diagnosis Date Noted    Traumatic closed fracture of C1 vertebra with minimal displacement, initial encounter ([facility] [address] [S12.000A] 05/04/2019     C1 fracture in setting of unwitnessed fall   CT C-spine showed nondisplaced dens fracture of C1. CT head did not show any acute findings. - neurosurgery consulted  - q4h neuro checks  - NPO in setting of AMS 2/2 ativan   - MRI tomorrow     KENDRA on CKD  Baseline creatinine around 1.6. Creatinine on presentation was 2.3.   - 250 cc bolus  - f/u BMP     Elevated Troponin   - f/u EKG   - continue to trend troponin     HFrEF - not in exacerbation   ECHO in 2015 showed EF 30-35%.  Patient has BiV ICD placed in 2013  - lasix 40 mg daily 5 times weekly - may need to hold if creatinine continues to worsen   - coreg 25 mg BID  - holding lasix in setting of KENDRA     B/L LE edema with multiple bullous-like lesions and possible surrounding cellulitis   Patient appears to have severe LE edema with chronic venous stasis. Multiple bullous-like lesions with eruptions and surrounding erythema concerning for cellulitis.    - wound care  - lasix as mentioned above     AF on warfarin   - amiodarone 200 mg 5 times weekly   - pharmacy to dose warfarin   - f/u INR   - will have device interrogated - representative is Meliza Arnold ([phone])     CAD  - continue statin     HTN  - continue medications listed above     Hypothyroidism  - can restart synthroid when appropriate   - f/u TSH w/reflex       DVT Prophylaxis: Warfarin   Diet: Diet NPO Effective Now  Code Status: Full Code    PT/OT Eval Status: pending    Dispo - GMF    Nellie Claude, MD  PGY-1 Internal Medicine

## 2019-05-05 NOTE — DISCHARGE INSTR - COC
Continuity of Care Form    Patient Name: Yuriy Murray   :  3/3/1929  MRN:  [id number]    Admit date:  2019  Discharge date:  ***    Code Status Order: Prior   Advance Directives:     Admitting Physician:  No admitting provider for patient encounter.   PCP: Michelle Perdmoo MD    Discharging Nurse: [facility] Unit/Room#: 007/B-07  Discharging Unit Phone Number: ***    Emergency Contact:   Extended Emergency Contact Information  Primary Emergency Contact: [address] [place] [address] Phone: [phone]  Relation: Child  Secondary Emergency Contact: Deedee Pastrana  Address: [place] [facility], [address] [facility],[address] Phone: [phone]  Work Phone: [phone]  Relation: Child    Past Surgical History:  Past Surgical History:   Procedure Laterality Date    CORONARY ANGIOPLASTY      DIAGNOSTIC CARDIAC CATH LAB PROCEDURE      EYE SURGERY Bilateral     cataracts    JOINT REPLACEMENT      MITRAL VALVE SURGERY      PACEMAKER PLACEMENT  11    PACEMAKER PLACEMENT  13    generator replacement, biventricular lead placement; programmed to DDDR with lower rate 60 and upper tracking of 120    TOTAL HIP ARTHROPLASTY         Immunization History:   Immunization History   Administered Date(s) Administered    Pneumococcal Polysaccharide (Wesibpwjm50) 2014    Tdap (Boostrix, Adacel) 2014       Active Problems:  Patient Active Problem List   Diagnosis Code    Atrial fibrillation ([address] I48.91    Hyperlipidemia E78.5    Hypertension I10    Renal insufficiency N28.9    Cardiomyopathy ([address] I42.9    Pacemaker Z95.0    Renal failure, acute on chronic ([address] N17.9, N18.9    Pubic ramus fracture (HCC) S32.599A    Atrial flutter (HCC) I48.92    Congestive heart failure (HCC) I50.9    Acute on chronic systolic CHF (congestive heart failure), NYHA class 3 (HCC) I50.23    Chronic cor pulmonale ([facility]) I27.81    S/P mitral valve repair Z98.890       Isolation/Infection:   Isolation          No Isolation            Nurse Assessment:  Last Vital Signs: /82   Pulse 100   Temp 97.4 °F (36.3 °C) (Oral)   Resp 15   Ht 5' 3\" (1.6 m)   Wt 121 lb (54.9 kg)   SpO2 99%   BMI 21.43 kg/m²     Last documented pain score (0-10 scale):    Last Weight:   Wt Readings from Last 1 Encounters:   05/04/19 121 lb (54.9 kg)     Mental Status:  {IP PT MENTAL STATUS:20030}    IV Access:  { CAMERON IV ACCESS:901405606}    Nursing Mobility/ADLs:  Walking   {CHP DME ZLSQ:404384771}  Transfer  {P DME LDIY:613439416}  Bathing  {P DME HKFJ:363092380}  Dressing  {P DME BWPK:788850515}  Toileting  {P DME KRXZ:698915631}  Feeding  {[facility] DME WXFF:125110987}  Med Admin  {[facility] DME XRLB:948836300}  Med Delivery   { CAMERON MED Delivery:722905354}    Wound Care Documentation and Therapy:        Elimination:  Continence:   · Bowel: {YES / TS:13519}  · Bladder: {YES / XI:30137}  Urinary Catheter: {Urinary Catheter:011860418}   Colostomy/Ileostomy/Ileal Conduit: {YES / XN:34779}       Date of Last BM: ***  No intake or output data in the 24 hours ending 05/04/19 2239  No intake/output data recorded.     Safety Concerns:     [address] Safety Concerns:941152933}    Impairments/Disabilities:      [address] Impairments/Disabilities:894201935}    Nutrition Therapy:  Current Nutrition Therapy:   [address] Diet List:008296033}    Routes of Feeding: {[facility] DME Other Feedings:015327349}  Liquids: {Slp liquid thickness:07528}  Daily Fluid Restriction: {CHP DME Yes amt example:586557582}  Last Modified Barium Swallow with Video (Video Swallowing Test): {Done Not Done IFHA:500385892}    Treatments at the Time of Hospital Discharge:   Respiratory Treatments: ***  Oxygen Therapy:  {Therapy; copd oxygen:25650}  Ventilator:    {HOLLI MCLEAN Vent WTAD:069328330}    Rehab Therapies: {THERAPEUTIC INTERVENTION:5188379284}  Weight Bearing Status/Restrictions: {HOLLI MCLEAN Weight Bearin}  Other Medical Equipment (for information only, NOT a DME order):  {EQUIPMENT:719520271}  Other Treatments: ***    Patient's personal belongings (please select all that are sent with patient):  {CHP DME Belongings:233007429}    RN SIGNATURE:  {Esignature:590023162}    CASE MANAGEMENT/SOCIAL WORK SECTION    Inpatient Status Date: ***    Readmission Risk Assessment Score:  Readmission Risk              Risk of Unplanned Readmission:        0           Discharging to Facility/ Agency   · Name:   · Address:  · Phone:  · Fax:    Dialysis Facility (if applicable)   · Name:  · Address:  · Dialysis Schedule:  · Phone:  · Fax:    / signature: {Esignature:498166414}    PHYSICIAN SECTION    Prognosis: {Prognosis:1479277705}    Condition at Discharge: [address] [place] [address] Patient Condition:754006916}    Rehab Potential (if transferring to Rehab): {Prognosis:8012454169}    Recommended Labs or Other Treatments After Discharge: ***    Physician Certification: I certify the above information and transfer of Jana Hatchet  is necessary for the continuing treatment of the diagnosis listed and that she requires {Admit to Appropriate Level of Care:77638} for {GREATER/LESS:537466990} 30 days.      Update Admission H&P: {CHP DME Changes in QNYVJ:209295251}    PHYSICIAN SIGNATURE:  {Esignature:502704192}

## 2019-05-05 NOTE — PROGRESS NOTES
Clinical Pharmacy Progress Note    Intravenous levothyroxine has been ordered for patient. Per IV levothyroxine protocol, order has been verified by pharmacist with a start date 6 days from yesterday. Until that time, chart will be reviewed by pharmacist daily, and if patient able to tolerate PO medications, the levothyroxine order will be converted to PO at appropriate dose conversion.           Orders for IV levothyroxine will not be deferred in the following cases:              Indication of myxedema coma  Patient awaiting harvest of organs for donation        Criteria for IV to PO conversion:  Diet ordered and patient tolerating (no nausea, vomiting, diarrhea)  Other PO meds are being administered         For questions about this protocol, please call pharmacy @ [address] PharmSHIRA  [id number]  5/5/2019 10:26 AM

## 2019-05-05 NOTE — CONSULTS
[address]                [address]                                  CONSULTATION    PATIENT NAME: Santa Alvarez                    :        1929  MED REC NO:   [id number]                          ROOM:       [id number]  ACCOUNT NO:   [de-identified]                           ADMIT DATE: 2019  PROVIDER:     Eliza Wilkins MD    CONSULT DATE:  2019    ATTENDING PROVIDER:  Unique Zapata MD    HISTORY OF PRESENT ILLNESS:  This patient is a 77-year-old woman, who  had an unwitnessed fall, not certain whether she had a loss of  consciousness or not. She was evaluated because of pain in her neck and  was found to have nondisplaced fracture at the base of the dense on the  CT scan. The patient was alert and oriented, but she was given Ativan  before transfer because she was agitated. The patient was placed in a  C-collar. She has significant comorbidities that are documented by the  Internal Medicine Service. PHYSICAL EXAMINATION:  On examination, the patient is sleepy. She is  arousable to painful stimulation. She withdraws all 4 extremities. She  does not speak. She does not respond to verbal commands. Her pupils  are equal and reactive. IMAGING STUDIES:  CT scan of the brain was normal and showed no evidence  of traumatic contusion. CT scan of the cervical spine was directly  reviewed by me. The patient has nondisplaced type 2 fracture of the  dense and a posterior fracture of the ring of C1. These are  nondisplaced and her alignment is stable. RECOMMENDATION:  I recommend no surgical intervention for her cervical  spine fractures. She should be immobilized in a cervical collar for a  period of 6-12 weeks, should be followed with dynamic flexion and  extension spine films after about 6 weeks, otherwise medical management.         Brett Scanlon MD    D: 2019 12:09:15       T: 2019 12:37:38 [email or web address]  Job#: [id number]     Doc#: [id number]    CC:

## 2019-05-05 NOTE — ED NOTES
Bed: B-07  Expected date:   Expected time:   Means of arrival:   Comments:  [address] Jean-Claude Warren RN  05/04/19 2003

## 2019-05-05 NOTE — PROGRESS NOTES
Patient is lethargic and has several po meds ordered (coreg, amiodarone, lipitor, lasix, vitamin k). Dr. Marcelino howard served.  Dayton Ashley

## 2019-05-05 NOTE — PROGRESS NOTES
Patient admitted to unit. VSS. Patient is drowsy and sleepy but responds to painful stimuli, medical residents aware. No neuro deficits. Wounds to bilateral feet as well as buttocks. Cervical collar in place. Placed on tele. Patient NPO at this time, not awake enough for bedside swallow eval. Fall precautions in place, will continue to monitor.

## 2019-05-05 NOTE — ED NOTES
Pt presents to the ER via EMS from home AOX3 s/p  Unwitnessed this afternoon. Per EMS family able to pick patient up and place her in bed. Pt now complaining of back pain and discomfort prior to coming to ER. Pt has bilt leg swelling and edema with weeping blisters on lower legs. Family states this has been going on for the last two months. Pt clothing is covered in urine and stool. Pt cleaned with bath wipes and placed in hospital gown. Per LESLEY Espinoza leave bilt feet open to air after cleaning wounds.       Nan Hanson RN  05/05/19 5170

## 2019-05-05 NOTE — CONSULTS
Clinical Pharmacy Consult Note    Admit date: 5/5/2019    Subjective/Objective:  80 y.o. female with PMH significant for AF (on warfarin), HFrED, ICD, HTN. Admitted with unwitnessed fall at home resulting in C1 fracture. Transferred to [facility] from [facility] for neurosurg intervention. Patient is on chronic anticoagulation with warfarin for AF. Pharmacy is asked to dose warfarin per Dr. Bernard Pool. Home anticoagulation regimen: 2 mg daily except 1 mg on Tues/Thurs  Warfarin   · Goal INR = 2-3  · Managed as an outpatient by Rocco Cadet  · Last INR check on 2/14 was 2.9  · Of note, patient has missed 3 appointments with [facility] according to documentation in Epic and usually has a visit every 5 weeks. Labs:  Lab Results   Component Value Date    HGB 11.6 (L) 05/05/2019    HCT 36.2 05/05/2019     05/05/2019       Assessment/Plan:  1) Anticoagulation  · Patient supratherapeutic INR on admission of 8.74 with no evidence of bleeding. · Hgb 11.6 today, around patient's baseline  · No vitamin K ordered/given. · Will hold warfarin until INR starts trending closer to 3. · Warfarin placeholder inserted into the MAR. · Daily INR will be monitored and dose adjustments made as needed. Date INR Warfarin Dose   5/5 8.74 --               Please call with any questions.   Will Fischer, PharmD   PGY1 Pharmacy Resident   Wireless: [phone]  5/5/2019 11:22 AM

## 2019-05-05 NOTE — ED PROVIDER NOTES
[address] [facility]        Pt Name: Anna Khan  MRN: [id number]  [place] 3/3/1929  Date of evaluation: 5/4/2019  Provider: Tung Demarco PA-C  PCP: Cammie Andrade MD    This patient was seen and evaluated by the supervising attending Dr. Darrell Tracy M.D.      Victor Manuel Kitchen   Patient presents with    Fall     Patient experienced a fall this afternoon. Unwitnessed, family able to pick patient up and place her in bed. Patient now complaining of back pain.  Leg Swelling     Bilateral edema and weeping blisters on lower legs, stated for the last two months by family. HISTORY OF PRESENT ILLNESS   (Location/Symptom, Timing/Onset, Context/Setting, Quality, Duration, Modifying Factors, Severity)  Note limiting factors. Anna Khan is a 80 y.o. female  who was brought in by squad with complaint of fall. According to the paramedics that she fell was trying to go to the restroom. Family called 46 and had her brought to the emergency room. Patient has no complaint. She does have noticeable bilateral lower extremity swelling and wounds to bilateral lower extremity legs and feet. Denies chest pain, denies hitting her head. No abdominal pain. No other complaints. Member is not present at this time. Nursing Notes were all reviewed and agreed with or any disagreements were addressed  in the HPI. REVIEW OF SYSTEMS    (2-9 systems for level 4, 10 or more for level 5)     Review of Systems   Constitutional: Negative for chills and fever. HENT: Negative for congestion, facial swelling and sore throat. Eyes: Negative for discharge and redness. Respiratory: Negative for apnea, choking and shortness of breath. Cardiovascular: Negative for chest pain. Gastrointestinal: Negative for abdominal pain, nausea and vomiting. Genitourinary: Negative for dysuria.    Musculoskeletal: Negative for back pain, neck pain and neck stiffness. Neurological: Negative for dizziness, tremors, seizures, weakness and headaches. All other systems reviewed and are negative. Positives and Pertinent negatives as per HPI. Except as noted abovein the ROS, all other systems were reviewed and negative.        PAST MEDICAL HISTORY     Past Medical History:   Diagnosis Date    Atrial fibrillation [facility])     cardioversion 4/2010 and 5/2011    Cardiomyopathy [facility])     CHF (congestive heart failure) ([address]     Heart murmur     Hyperlipidemia     Hypertension     Mitral insufficiency     Pacemaker 12/23/13    original PM 11/18/11; 12/23/13 generator replace and biventricular lead placement         SURGICAL HISTORY     Past Surgical History:   Procedure Laterality Date    CORONARY ANGIOPLASTY      DIAGNOSTIC CARDIAC CATH LAB PROCEDURE      EYE SURGERY Bilateral     cataracts    JOINT REPLACEMENT      MITRAL VALVE SURGERY      PACEMAKER PLACEMENT  11/18/11    PACEMAKER PLACEMENT  12/23/13    generator replacement, biventricular lead placement; programmed to DDDR with lower rate 60 and upper tracking of [address] [address]       Discharge Medication List as of 5/5/2019 12:43 AM      CONTINUE these medications which have NOT CHANGED    Details   atorvastatin (LIPITOR) 10 MG tablet TAKE ONE TABLET BY MOUTH DAILY, Disp-90 tablet, R-1Normal      carvedilol (COREG) 25 MG tablet TAKE ONE TABLET BY MOUTH TWICE A DAY WITH MEALS, Disp-60 tablet, R-9Normal      furosemide (LASIX) 40 MG tablet TAKE ONE TABLET BY MOUTH FIVE TIMES WEEKLY, Disp-30 tablet, R-9Normal      amiodarone (CORDARONE) 200 MG tablet TAKE ONE TABLET BY MOUTH FIVE TIMES WEEKLY, Disp-30 tablet, R-9Normal      lisinopril (PRINIVIL;ZESTRIL) 5 MG tablet TAKE ONE TABLET BY MOUTH DAILY, Disp-30 tablet, R-9Normal      levothyroxine (SYNTHROID) 25 MCG tablet TAKE ONE TABLET BY MOUTH DAILY, Disp-30 tablet, R-9Normal      warfarin (COUMADIN) 2 MG tablet TAKE ONE TO TWO TABLETS BY MOUTH DAILY, Disp-45 tablet, R-4Normal      acetaminophen (AMINOFEN) 325 MG tablet Take 2 tablets by mouth every 6 hours as needed for Pain, Disp-30 tablet, R-0Print      Polysaccharide Iron Complex (PROFE) 391.3 (180 FE) MG CAPS Take 180 mg by mouth daily. , Disp-30 capsule, R-2               ALLERGIES     Patient has no known allergies. FAMILYHISTORY       Family History   Problem Relation Age of Onset    Other Mother     Other Father           SOCIAL HISTORY       Social History     Socioeconomic History    Marital status:       Spouse name: None    Number of children: None    Years of education: None    Highest education level: None   Occupational History    None   Social Needs    Financial resource strain: None    Food insecurity:     Worry: None     Inability: None    Transportation needs:     Medical: None     Non-medical: None   Tobacco Use    Smoking status: Never Smoker    Smokeless tobacco: Never Used   Substance and Sexual Activity    Alcohol use: No    Drug use: No    Sexual activity: Never   Lifestyle    Physical activity:     Days per week: None     Minutes per session: None    Stress: None   Relationships    Social connections:     Talks on phone: None     Gets together: None     Attends Mu-ism service: None     Active member of club or organization: None     Attends meetings of clubs or organizations: None     Relationship status: None    Intimate partner violence:     Fear of current or ex partner: None     Emotionally abused: None     Physically abused: None     Forced sexual activity: None   Other Topics Concern    None   Social History Narrative    None       SCREENINGS             PHYSICAL EXAM    (up to 7 for level 4, 8 or more for level 5)     ED Triage Vitals [05/04/19 2006]   BP Temp Temp Source Pulse Resp SpO2 Height Weight   -- 97.4 °F (36.3 °C) Oral -- -- -- -- --       Physical Exam   Constitutional: She ED Provider with the below findings:        Interpretation [facility] Radiologist below, if available at the time of this note:    XR CHEST PORTABLE   Final Result   Stable cardiomegaly. Lungs are grossly clear. No evidence of an acute   injury. CT Cervical Spine WO Contrast   Final Result   Nondisplaced fracture involving the base of the dens. Fracture involving the posterior right ring of C1 which appears slightly   widened and there is questionable widening at the atlantoaxial interval.   Recommend further evaluation of the cervical spine with MRI to assess for   cord or ligamentous injury. Findings were discussed with Makayla Gordon at 8:56 pm on 5/4/2019. CT Head WO Contrast   Final Result   No acute intracranial abnormality. [unfilled]      PROCEDURES   Unless otherwise noted below, none     Procedures    CRITICAL CARE TIME   N/A    CONSULTS:  IP CONSULT TO NEUROSURGERY      EMERGENCY DEPARTMENT COURSE and DIFFERENTIALDIAGNOSIS/MDM:   Vitals:    Vitals:    05/04/19 2335 05/04/19 2342 05/04/19 2345 05/04/19 2349   BP:  126/77     Pulse: 100 98 103 97   Resp: 18 16 17 18   Temp:  97.6 °F (36.4 °C)     TempSrc:       SpO2:  97%     Weight:       Height:           Patient was given thefollowing medications:  Medications   LORazepam (ATIVAN) injection 1 mg (1 mg Intravenous Given 5/4/19 2320)       Emergency room course: A shin on exam pupils equal round and reactive to light extraocular movements intact throat was clear and nonerythematous no exudate. Neck shows no midline tenderness. She has no midline tests are within as a lumbar spine. No bruising noted on her back. No chest wall bruising. Cardiovascular regular rate rhythm, lungs clear no wheeze rales or rhonchi. Abdomen was soft nontender. Bilateral lower extremity shows 4+ pitting edema with open blister wounds to the bilateral dorsal foot. Thickening fungal nails to all toes.  See picture below for wounds on bilateral AM      PATIENT REFERREDTO:  No follow-up provider specified.     DISCHARGE MEDICATIONS:  Discharge Medication List as of 5/5/2019 12:43 AM          DISCONTINUED MEDICATIONS:  Discharge Medication List as of 5/5/2019 12:43 AM                 (Please note that portions ofthis note were completed with a voice recognition program.  Efforts were made to edit the dictations but occasionally words are mis-transcribed.)    Harleen Michael PA-C (electronically signed)           Harleen Michael PA-C  05/05/19 0222

## 2019-05-05 NOTE — PROGRESS NOTES
4 Eyes Admission Assessment     I agree as the admission nurse that 2 RN's have performed a thorough Head to Toe Skin Assessment on the patient. ALL assessment sites listed below have been assessed on admission. Areas assessed by both nurses:   [x]   Head, Face, and Ears   [x]   Shoulders, Back, and Chest  [x]   Arms, Elbows, and Hands   [x]   Coccyx, Sacrum, and Ischum  [x]   Legs, Feet, and Heels        Does the Patient have Skin Breakdown? Multiple open blisters to bilateral feet as well as unopened blisters. Blanchable redness to buttocks. Stage 2 to buttocks. Skin tears to bilateral shins and upper arms.          Peter Prevention initiated:  Yes   Wound Care Orders initiated:  NA      [facility] nurse consulted for Pressure Injury (Stage 3,4, Unstageable, DTI, NWPT, and Complex wounds):  NA      Nurse 1 eSignature: Electronically signed by Anamika Lewis RN on 5/5/19 at 7:18 AM    **SHARE this note so that the co-signing nurse is able to place an eSignature**    Nurse 2 eSignature: Electronically signed by Judith Kidd RN on 5/5/19 at 7:33 AM

## 2019-05-05 NOTE — ED NOTES
Fall risk screening completed. Fall risk bracelet applied to patient. Non-skid socks provided and placed on patient. Bed alarm applied and activated. The call light is within the patient's reach, and instructions for use were provided. The bed is in the lowest position with wheels locked. The patient has been advised to notify staff, using the call light, if there is a need to get up or use restroom. The patient and family verbalized understanding of safety precautions and how to contact staff for assistance.       Renetta Rivera RN  05/04/19 8222

## 2019-05-05 NOTE — ED NOTES
Pt placed in C-Collar per MD Rosalina Gauthierveredia verbal order.       Paddy Mosley RN  05/04/19 7052

## 2019-05-05 NOTE — PROGRESS NOTES
Patients urine output since 0700 has been 200 ml. Perfect serve sent to resident on call. Dr. Mercy Rizo. Message read.  Mendy Hodges

## 2019-05-05 NOTE — ED NOTES
On arrival to ER pt with several wounds on bilt arm, legs and feet noted on arrival. Skin excoriated and blisters on feet. Pt covered in old dry stool. LESLEY Rayo made aware.       Thony Rodrigez RN  05/05/19 53

## 2019-05-05 NOTE — PROGRESS NOTES
Clinical Pharmacy Progress Note    Intravenous levothyroxine has been ordered for patient. Per IV levothyroxine protocol, order has been verified by pharmacist with a start date 6 days from today. Until that time, chart will be reviewed by pharmacist daily, and if patient able to tolerate PO medications, the levothyroxine order will be converted to PO at appropriate dose conversion. Orders for IV levothyroxine will not be deferred in the following cases:              Indication of myxedema coma  Patient awaiting harvest of organs for donation        Criteria for IV to PO conversion:  Diet ordered and patient tolerating (no nausea, vomiting, diarrhea)  Other PO meds are being administered         For questions about this protocol, please call pharmacy @ [phone]    Thank you!     Baldemar Teixeira Rph

## 2019-05-06 NOTE — PROGRESS NOTES
PGY-1 Resident Progress Note  [facility]    Admit Date: 5/5/2019       CC: C1 fracture    Overnight Events:  No acute events overnight. Pt seen at bedside sleeping. She is not responsive to verbal stimulation but will respond to noxious stimulation (sternal rub) and said \"stop it! \" OF note, pt received dilaudid 0.5mg IV x1 as she was complaining of pain. When entering room, pt did not have C-collar on. Family Hx:   Family History   Problem Relation Age of Onset    Other Mother     Other Father        Scheduled Medications:    sodium chloride flush  10 mL Intravenous 2 times per day    warfarin (COUMADIN) daily dosing (placeholder)   Other See Admin Instructions    [START ON 5/10/2019] levothyroxine  12.5 mcg Intravenous QAM AC    And    [START ON 5/11/2019] sodium chloride (PF)  5 mL Intravenous Daily    metoprolol  2.5 mg Intravenous Q6H      PRN Medications: sodium chloride flush, magnesium hydroxide, ondansetron, perflutren lipid microspheres  Diet: Diet NPO Effective Now    Continuous Infusions:   sodium chloride      amiodarone 450mg/250ml D5W infusion 0.5 mg/min (05/06/19 0427)       PHYSICAL EXAM:  /83   Pulse 80   Temp 97.6 °F (36.4 °C) (Axillary)   Resp 16   SpO2 98%   No results for input(s): POCGLU in the last 72 hours. Intake/Output Summary (Last 24 hours) at 5/6/2019 1408  Last data filed at 5/6/2019 0630  Gross per 24 hour   Intake 502.5 ml   Output 450 ml   Net 52.5 ml       Physical Exam   Constitutional: No distress. HENT:   Head: Atraumatic. Right Ear: External ear normal.   Left Ear: External ear normal.   Eyes: Right eye exhibits no discharge. Left eye exhibits no discharge. No scleral icterus. Neck: Neck supple. No tracheal deviation present. No thyromegaly present. Cardiovascular: Normal rate. Exam reveals no friction rub. No murmur heard. irreg irreg HR   Pulmonary/Chest: Effort normal and breath sounds normal. No respiratory distress.    Abdominal: Soft. Bowel sounds are normal. She exhibits no distension. There is no tenderness. Musculoskeletal: She exhibits no edema, tenderness or deformity. Neurological:   Unable to assess   Skin: Capillary refill takes less than 2 seconds. No rash noted. She is not diaphoretic. No erythema. No pallor. Psychiatric:   Unable to assess     LABS:  Recent Labs     05/04/19 2129 05/05/19 0618 05/06/19  0600   WBC 7.1 10.3 9.0   HGB 12.5 11.6* 12.2   HCT 38.6 36.2 37.6    162 134*                                                                    Recent Labs     05/04/19 2129 05/05/19  0618 05/06/19  0600    142 142   K 4.9 4.7 5.0    103 105   CO2 27 24 20*   BUN 50* 56* 51*   CREATININE 2.3* 2.7* 2.4*   GLUCOSE 95 86 73     Recent Labs     05/04/19 2129   AST 35   ALT 24   BILITOT 1.1*   ALKPHOS 108     Recent Labs     05/05/19 0207 05/05/19  0928 05/05/19  1413   TROPONINI 0.24* 0.21* 0.20*     No results for input(s): BNP in the last 72 hours. No results for input(s): CHOL, HDL in the last 72 hours. Invalid input(s): LDLCALCU  Recent Labs     05/05/19 0208 05/06/19  0605   INR 8.74* 10.45*       Imaging:          CT HEAD WO CONTRAST   Final Result     No acute hemorrhage, hydrocephalus, or mass effect. Assessment & Plan: Ulises Parekh is a 80 y.o. female with PMH of AF (on warfarin), HFrEF (30-35%) with Biventricular ICD and HTN who [facility] after an unwitnessed fall and admitted for a C1 fracture. C1 fracture in setting of unwitnessed fall   CT C-spine showed nondisplaced dens fracture of C1. CT head did not show any acute findings.    - neurosurgery consulted; no sx intervention now  -- [place]-J collar for 6-12 wks then dynamic flexion/extenasion spine film after 6 wks  - q4h neuro checks  - NPO in setting of AMS 2/2 ativan   - [employer] Pacemaker rep (Caryn Eagle @ [address] [place] [address] - [phone]) contacted  -- device not MRI compatible per

## 2019-05-06 NOTE — CARE COORDINATION
2019  [facility])  Clinical Case Management Department    Consult received to assist with discharge plans. Pt will need a short SNF stay upon discharge. Family was interested in three places which included [facility], [place] and [facility]. [place] is able to accept this patient upon discharge and pt will have a private room. Family plans on having the patient to return to home from SNF to live with her daughter. Patient:  Saray Gonzales  MRN: [id number] / : 3/3/1929  ACCT: [de-identified]     Emergency Contacts  Healthcare Agent Appointed: Healthcare power of   Healthcare Agent's Name: Flavio Angel[employer] [address] Agent's Phone Number: [phone]    Admission Documentation  Attending Provider: Whitman Bloch, MD  Admit date/time: 2019 12:55 AM  Status: Inpatient [101]  Diagnosis: Traumatic closed fracture of C1 vertebra with minimal displacement, initial encounter [facility])     Readmission within last 30 days:  no     Living Situation  Discharge Planning  Living Arrangements: Alone  Support Systems: Family Members  Potential Assistance Needed: [address] [place] [address] Road: OT, PT  Patient expects to be discharged to[de-identified] home  Expected Discharge Date: 19    Service Assessment       Values / Beliefs  Do you have any ethnic, cultural, sacramental, or spiritual Restorationist needs you would like us to be aware of while you are in the hospital?: No    Advance Directives (For Healthcare)  Healthcare Directive: Yes, patient has an advance directive for healthcare treatment  Type of Healthcare Directive: Durable power of  for health care, Living will  Copy in Chart: No, copy requested from family  [facility] Agent's Name: Flavio Angel[employer] [address] [place] [address] Agent's Phone Number: [phone]  If you are unable to speak for yourself, does your Healthcare Agent or Legal Spokesperson know your healthcare wishes?: Yes                        Destination  skilled nursing facility    [facility]   deferred    Home Health/Skilled [address] at Discharge: SNF Physical Therapy, Occupational Therapy and Nursing daily  Home care at home?  No Provider: DEE Provider Phone: [address]  DEE  Pharmacy: [facility]  Potential Assistance Purchasing Medications:  No  Does patient want to participate in local refill/meds to beds program?: No    Goals of Care  Patient expects to be discharged to[de-identified] home  Patient plans for SNF: [facility]         Mode of transport from hospital: will need transport    Factors facilitating achievement of predicted outcomes: Family support, Cooperative and Pleasant    Barriers to discharge: will need kristin Alicia RN  [facility]  Case Management Department  Ph: [phone] Fax: [phone]

## 2019-05-06 NOTE — PROGRESS NOTES
Notified by lab of critical INR of 10.45 this AM at 0.65. Perfect serve message sent to Dr. Pj German at [phone] and made aware. Message read, waiting response.

## 2019-05-06 NOTE — PROGRESS NOTES
Physical Therapy/Occupational Therapy  Hold note  Orders received. Chart reviewed. Pt with orders for activity once [place] J neck brace in place. Per RN [place] J brace hasn't arrived yet. PT/OT will attempt to work with pt at later time vs later date as appropriate. Discussed with RN.        Elen Mann, PT, DPT [id number]   Taina Cerna OTR/L  [phone]

## 2019-05-06 NOTE — PROGRESS NOTES
VSS. Lungs clear and shows no signs of SOB. Patient is alert to self only. Patient has been drowsy most of the shift but when she wakes up she does not know where she is and can not state name. Family has been at bedside and updated. Patient has minimal urine output during shift. Waiting on [place] J collar. Bed alarm on and door open. Will continue to monitor closely.

## 2019-05-06 NOTE — PROGRESS NOTES
Patient more alert as night went on. Oriented to person. C/O pain to neck and trying to remove C-Collar. Repositioned for comfort. MD made aware. New orders received. Will monitor.

## 2019-05-06 NOTE — PROGRESS NOTES
Brace shop came, [place] J fitted and in place now at shift change. Messaged residents about pt not having any continuous fluids and pt being NPO. Pt refuses to open eyes or speak to us. Will continue to monitor and assess. Pt refusing to wear [place] J collar, pt attempting to rip off at all times. Pt to wear [place] J when out of bed. Pt resting comfortably at this time in bed, repositioning patient every 2 hours.

## 2019-05-06 NOTE — PROGRESS NOTES
Clinical Pharmacy Progress Note    Admit date: 5/5/2019    Subjective/Objective:  80 y.o. female with PMH significant for AF (on warfarin), HFrEF, ICD, HTN. Admitted with unwitnessed fall at home resulting in C1 fracture. Transferred to [facility] from [facility] for neurosurgery evaluation. Per neurosurgery, surgical intervention is not recommended for her cervical spine fractures. Patient is on chronic anticoagulation with warfarin for AF. Pharmacy is asked to dose warfarin per Dr. Lilia Back. Home anticoagulation regimen: 2 mg daily except 1 mg on Tues/Thurs  Warfarin   · Goal INR = 2-3  · Managed as an outpatient by Rocco Cadet  · Last INR check on 2/14 was 2.9  · Of note, patient has missed 3 appointments with Talat Rogers according to documentation in Epic and usually has a visit every 5 weeks. Labs:  Lab Results   Component Value Date    HGB 12.2 05/06/2019    HCT 37.6 05/06/2019     (L) 05/06/2019       Date INR Warfarin Dose   5/5 8.74 --   5/6 10.45 --            Assessment/Plan:  1) Anticoagulation  · INR increased again today to 10.45; however, Hgb remains stable at 12.2 and no notes of bleeding. · Vitamin K 2 mg IV x 1 dose has been ordered since patient is a strict NPO. · Will continue to hold warfarin until INR starts trending closer to 3. · Warfarin placeholder inserted into the MAR. · Daily INR will be monitored and dose adjustments made as needed. Please call with any questions.   Kiara Hernández PharmD, [facility]  [phone]  5/6/2019 11:20 AM

## 2019-05-06 NOTE — PROGRESS NOTES
Neurosurgery Plan:    Patient is a 79 y/o F with nondisplaced type 2 fracture of the dense and a posterior fracture of the ring of C1. These are nondisplaced and her alignment is stable. Neurologic Exam: sleeping. No commands. No verbalizations. Plan:  Please place [place] J collar  Okay for activity after collar in place  HOB <30 degrees until placement - thereafter no restrictions  Follow up in 6 weeks w/ flex / ex xrays  We will sign off  Please call with questions.     Asif Bonilla  9:35 AM  05/06/19

## 2019-05-07 NOTE — PROGRESS NOTES
Occupational Therapy   Occupational Therapy Initial Assessment and Treatment Note   Date: 2019    Patient Name: Hershal Collet  MRN: [id number]     : 3/3/1929    Date of Service: 2019    Discharge Recommendations: Hershal Collet scored a 8/24 on the AM-PAC ADL Inpatient form. Current research shows that an AM-PAC score of 17 or less is typically not associated with a discharge to the patient's home setting. Based on the patients AM-PAC score and their current ADL deficits, it is recommended that the patient have 3-5 sessions per week of Occupational Therapy at d/c to increase the patients independence. OT Equipment Recommendations  Equipment Needed: No  Other: defer to [facility] center     Assessment   Performance deficits / Impairments: Decreased cognition;Decreased functional mobility ; Decreased ADL status; Decreased safe awareness  Assessment: Pt from home - mostly independent per chart. Pt appears unkempt ? ability to perform selfcare at baseline. Pt demo poor ability to activly participate this am. Pt needing 2 person assist for functional transfers and Dep with all selfcare this date. Pt would benefit from ongoing OT as inpt to maximize functional level. Pt limited by poor cognitive state / limited carryover this date. Will follow as inpt. Treatment Diagnosis: impaired ADLs/ functional tranfers 2/2 Fall ? Prognosis: Fair;Poor  Decision Making: Medium Complexity  Patient Education: Role of OT / activity promotion - verb no learning- reteach as needed. REQUIRES OT FOLLOW UP: Yes  Activity Tolerance  Activity Tolerance: Treatment limited secondary to decreased cognition  Activity Tolerance: Pt limited ability to participate this date. Safety Devices  Safety Devices in place: Yes  Type of devices: Call light within reach; Chair alarm in place; Left in chair;Nurse notified           Patient Diagnosis(es): There were no encounter diagnoses.      has a past medical history of Atrial fibrillation ([facility] [address] Cardiomyopathy ([address] CHF (congestive heart failure) ([facility] [address] Heart murmur, Hyperlipidemia, Hypertension, Mitral insufficiency, and Pacemaker. has a past surgical history that includes Diagnostic Cardiac Cath Lab Procedure; Coronary angioplasty; Mitral valve surgery; Total hip arthroplasty; pacemaker placement (11/18/11); joint replacement; eye surgery (Bilateral); and pacemaker placement (12/23/13). Treatment Diagnosis: impaired ADLs/ functional tranfers 2/2 Fall ? Restrictions  Position Activity Restriction  Other position/activity restrictions: up with assist, [place] J at all times    Subjective   General  Chart Reviewed: Yes  Additional Pertinent Hx: Admit 5/5 with fall from [facility] with + Fracture involving the posterior right ring of C1 which appears slightly on CT C spine, CT head -neg      Neurosx consult - no sx -recommend [place] J collar on when OOB      PMHX: Afib, CHF, Pacemaker placement , CAD and HLD   Family / Caregiver Present: No  Diagnosis: Cervical C1 fx  2/2 fall   Subjective  Subjective: \"no, no \" pt in found in bed asleep with gown and O2 removed. Pt RN notes pt pulling out lines and removing brace last pm.  Pt awakens with mod stimulus - most non conversant   Pain Assessment  Pain Assessment: 0-10  Pain Level: (MIGUEL ÁNGEL d/t dementia )  Height and Weight  Height: (not recorded yet this admission)  Oxygen Therapy  SpO2: 93 %  Pulse Oximeter Device Mode: Intermittent  Pulse Oximeter Device Location: Finger  O2 Device: None (Room air)    Social/Functional History  Social/Functional History  Lives With: Family  Type of Home: House  Home Layout: Two level(per notes lives on 2nd floor - unable to perform steps )  ADL Assistance: Independent(per RN notes from family - pt will disheveled / unkempt appearance )  Homemaking Assistance: Needs assistance  Additional Comments: Pt unable to answer questions - family not present -- Pt demo poor recall of home / PLOF ?  dementia Objective  Treatment included functional transfer training, ADL's and pt. education. Vision: Within Functional Limits(unable to assess )  Hearing: Exceptions to [facility]  Hearing Exceptions: Hard of hearing/hearing concerns    Orientation  Overall Orientation Status: Impaired  Orientation Level: Oriented to person;Disoriented to situation;Disoriented to time;Disoriented to place     Balance  Sitting Balance: Minimal assistance(initially improved to SBA at EOB x 10 mins)  Standing Balance: Unable to assess(comment)(unable/ unwilling  to tolerate full stance )  Standing Balance  Time: <20 sec for functional transfers   Functional Mobility  Functional Mobility Comments: unable to attempt 2/2 decreased alertness/ unwillingness   Toilet Transfers  Toilet - Technique: Stand pivot; Sit pivot  Equipment Used: Standard bedside commode  Toilet Transfer: Dependent/Total(Max A x 2 )  Toilet Transfers Comments: simulated to bed <> chair -- pt has patel cath in place   ADL  Feeding: Maximum assistance(pt refusing to drink with assist from cup -- slight coughing noted -RN aware)  Grooming: Maximum assistance(to wipe face / use toothette  to swap mouth )  UE Dressing: Dependent/Total(with gown - no attempts to assist with cues )  LE Dressing: Dependent/Total  Toileting: Dependent/Total(patel cath in place )        Bed mobility  Supine to Sit: Maximum assistance(with max vc and tactile cues; HOB up)  Scooting: Maximal assistance(with max vc)  Transfers  Sit to stand: Dependent/Total(Max A x 2 )  Stand to sit: Dependent/Total(Max x 2 )  Transfer Comments: pt slightly resistive to mobility   Vision - Basic Assessment  Prior Vision: No visual deficits  Cognition  Overall Cognitive Status: Exceptions  Arousal/Alertness: Inconsistent responses to stimuli  Following Commands: Inconsistently follows commands  Attention Span: Difficulty attending to directions  Memory: Decreased long term memory;Decreased short term memory;Decreased recall of biographical Information  Safety Judgement: Decreased awareness of need for safety  Problem Solving: Decreased awareness of errors  Insights: Not aware of deficits  Initiation: Requires cues for all  Sequencing: Requires cues for all  Cognition Comment: ? baseline cognitive level. ? dementia     LUE AROM (degrees)  LUE AROM : WFL  LUE General AROM: pt needing increased cues / physical assist to perform    Left Hand AROM (degrees)  Left Hand AROM: WFL  RUE AROM (degrees)  RUE AROM : WFL  RUE General AROM: pt needing increased cues / physical assist to perform    Right Hand AROM (degrees)  Right Hand AROM: WFL  LUE Strength  Gross LUE Strength: WFL  L Hand Grasp: 4/5  L Hand Release: 4/5  LUE Strength Comment: poor assessment noted   RUE Strength  Gross RUE Strength: WFL  R Hand Grasp: 4/5  R Hand Release: 4/5  RUE Strength Comment: poor assessment noted      Plan   Plan  Times per week: 2-5x  Times per day: Daily  Current Treatment Recommendations: Functional Mobility Training, Self-Care / ADL, Cognitive Reorientation, Patient/Caregiver Education & Training, Safety Education & Training    AM-PAC Score  AM-[facility] Inpatient Daily Activity Raw Score: 8  AM-PAC Inpatient ADL T-Scale Score : 22.86  ADL Inpatient CMS 0-100% Score: 85.69  ADL Inpatient CMS G-Code Modifier : CM    Goals  Short term goals  Time Frame for Short term goals: at d/c   Short term goal 1: Sit at EOB x7 mins with Supervision   Short term goal 2: Grooming with Min A and Mod v.cues   Short term goal 3: Commode transfers with Mod A x1   Short term goal 4: Follow 1 step directions > 50 % of task.       Therapy Time   Individual Concurrent Group Co-treatment   Time In 0751         Time Out 0830         Minutes 39              Timed Code Treatment Minutes:  24 mins     Total Treatment Minutes:  44 mins       Jahaira Mackey OT

## 2019-05-07 NOTE — PROGRESS NOTES
Nutrition Assessment    Type and Reason for Visit: Initial, Positive Nutrition Screen    Nutrition Recommendations:   · Continue NPO, advance diet as soon as medically feasible  · When diet advances, recommend trial Ensure Enlive BID  · Please obtain current weight and height, if able  · Monitor nutrition adequacy, weights, pertinent labs, BMs and clinical progress    Nutrition Assessment: Positive screen for pressure wounds and chew/swallow difficulty. Pt nutritionally compromised by stage 1 pressure wound. At risk for nutrition decline d/t increased nutrition needs for wound healing and NPO status. Per chart review, pt refusing to open her eyes or speak with staff. Pt also confused when alert. Will add ONS when appropriate to aid in wound healing. Will continue to monitor nutrition status and ability to advance diet.      Malnutrition Assessment:  · Malnutrition Status: Insufficient data    Nutrition Risk Level: High    Nutrient Needs:  · Estimated Daily Total Kcal: 2005-2938  · Estimated Daily Protein (g): 66-77  · Estimated Daily Total Fluid (ml/day): 1 ml/kcal or per MD    Nutrition Diagnosis:   · Problem: Inadequate oral intake  · Etiology: related to Insufficient energy/nutrient consumption     Signs and symptoms:  as evidenced by NPO status due to medical condition    Objective Information:  · Nutrition-Focused Physical Findings: active BS, +2 BLE edema  · Wound Type: Pressure Ulcer, Stage I  · Current Nutrition Therapies:  · Oral Diet Orders: NPO   · Oral Diet intake: NPO  · Oral Nutrition Supplement (ONS) Orders: None  · Anthropometric Measures:  · Ht: (not recorded yet this admission)   · Current Body Wt: 121 lb (54.9 kg)(Actual on 5/4/19)  · Ideal Body Wt: (MIGUEL ÁNGEL),  · BMI Classification: (MIGUEL ÁNGEL, no height available)    Nutrition Interventions:   Continue NPO, Start oral diet, Start ONS(when medically appropriate)  Continued Inpatient Monitoring    Nutrition Evaluation:   · Evaluation: Goals set · Goals: Pt will advance to PO diet with intakes of 50% or more this admission    · Monitoring: Nutrition Progression, Meal Intake, Diet Tolerance, Weight, Pertinent Labs      Electronically signed by Josiane Chávez RD, LD on 5/7/19 at 10:58 AM    Contact Number: [phone]

## 2019-05-07 NOTE — PROGRESS NOTES
VSS. Patient restless and pulled out IV. Notified MD because patient is on Amiodarone drip. Will attempt another IV. Cleaned patient up and returned her back to bed. Patient unable to answer questions. Bed alarm on and camera in place. Will continue to monitor closely.

## 2019-05-07 NOTE — PROGRESS NOTES
Attending Supervising Physician's Attestation Statement    I have personally seen and evaluated this patient. I discussed findings and management with the resident on 05/07/19  and agree as documented in this note.     Cc: Fall/C1 fracture     Pt appears better. Opening eyes, answering few questions and following some commands.      Assessment/Plan:  Fall: Unwitnessed, likely mechanical  Close odontoid fracture/C1 fracture: [place] J collar. No surgical intervention needed per NSGY. Needs flex/ex XR in 6 weeks  Acute toxic encephalopathy: Likely sec to medication in the setting of underlying possible dementia. Received ativan and dilaudid. Improving now. Might need to consider low dose seroquel if patient continues to sundown. Chronic systolic heart failure: Cont lasix and IV metoprolol. Convert to oral once able to tolerate PO per speech. LE edema with bullous lesions: Sec to fluid overload. Cont lasix and wound care. KENDRA on CKD: Likely cardiorenal. Cont lasix  Afib: Holding warfarin. Cont amio   Supratherapeutic INR: Hold warfarin. S/p vit K. Monitor INR. Might be able to resume warfarin tomorrow  CAD/HTN: unable to give oral meds.  Resume once able to take PO  Hypothyroidism: [address]  Attending Physician  Hospitalist

## 2019-05-07 NOTE — PROGRESS NOTES
Patient able to swallow applesauce and drink water. MD notified and patient switched to oral meds. Notified MD to inform that patient is longer receiving continuous amiodarone d/t loss of IV access. Waiting on new orders. Patient still restless and attempting to climb out of bed. Will continue to monitor closely.

## 2019-05-07 NOTE — PROGRESS NOTES
Physical Therapy    Facility/Department: [facility] 5T ORTHO/NEURO  Initial Assessment    NAME: Neale Merlin  : 3/3/1929  MRN: [id number]    Date of Service: 2019    Discharge Recommendations: Neale Merlin scored a 7/24 on the AM-PAC short mobility form. Current research shows that an AM-PAC score of 17 or less is typically not associated with a discharge to the patient's home setting. Based on the patients AM-PAC score and their current functional mobility deficits, it is recommended that the patient have 3-5 sessions per week of Physical Therapy at d/c to increase the patients independence. PT Equipment Recommendations  Equipment Needed: (defer)    Assessment   Body structures, Functions, Activity limitations: Decreased functional mobility   Assessment: Pt demo mobility below baseline reported by  as she is now requiring assist x2 for transfers and unable to stand. Pt unable to state her discharge plan. Per chart, pt is pending SNF at discharge. Pt would benefit from continued PT trial at discharge. Treatment Diagnosis: mobility impairment due to fall  Decision Making: Medium Complexity  Patient Education: Pt educated on PT role and she was unable to verbalize understanding. REQUIRES PT FOLLOW UP: Yes  Activity Tolerance  Activity Tolerance: Patient limited by cognitive status; Patient limited by pain; Patient limited by endurance       Patient Diagnosis(es): There were no encounter diagnoses. has a past medical history of Atrial fibrillation ([address] Cardiomyopathy ([address] CHF (congestive heart failure) ([address] Heart murmur, Hyperlipidemia, Hypertension, Mitral insufficiency, and Pacemaker. has a past surgical history that includes Diagnostic Cardiac Cath Lab Procedure; Coronary angioplasty; Mitral valve surgery; Total hip arthroplasty; pacemaker placement (11); joint replacement; eye surgery (Bilateral); and pacemaker placement (13).     Restrictions  Position Activity Restriction  Other position/activity restrictions: up with assistKoko at all times     Vision/Hearing  Vision: (unable to assess-pt would not open eyes much)  Hearing: Exceptions to [facility]  Hearing Exceptions: Hard of hearing/hearing concerns       Subjective  General  Additional Pertinent Hx: 80 y.o. female with PMH significant for AF (on warfarin), HFrED, ICD, HTN. Admitted with unwitnessed fall at home resulting in C1 fracture; neurosurgery consult-Koko FIGUEROA. Family / Caregiver Present: No  Diagnosis: C1 fracture  Follows Commands: Impaired(inconsistent following commands)  Pain Screening  Patient Currently in Pain: Denies(denies when asked but pt yells out when LEs touched)         Orientation  Orientation  Overall Orientation Status: Impaired(able to state last name-otherwise not oriented)     Social/Functional History  Social/Functional History  Lives With: Family  Type of Home: House  Home Layout: Two level(per notes lives on 2nd floor - unable to perform steps )  ADL Assistance: Independent(per RN notes from family - pt will dishelved / unkempt appearance )  Homemaking Assistance: Needs assistance  Additional Comments: Pt unable to answer questions - family not present -- Pt demo poor recall of home / PLOF ? dementia     Objective          PROM RLE (degrees)  RLE PROM: WFL  PROM LLE (degrees)  LLE PROM: [facility]     Strength RLE  Strength RLE: (2+/5 grossly-unable to formally MMT)  Strength LLE  Strength LLE: (2+/5 grossly-unable to formally MMT)        Bed mobility  Supine to Sit: Maximum assistance(with max vc and tactile cues; HOB up)  Scooting: Maximal assistance(with max vc)     Transfers  Sit to Stand: (attempt to stand with max assist x2 and pt unable/unwilling-max LOB posterior)  Bed to Chair: Dependent/Total(assist x2 via sit pivot txfer; pt unable/unwilling to stand or step)           Balance  Sitting - Static: Fair;+  Sitting - Dynamic: Poor  Comments: Pt sat EOB x7 mins with CGA, eyes closed.  Max assist to apply [place] J collar. Exercises  Comments: Pt nabila KNOTT LAQ x10 each LE with tactile cues in sitting. Plan   Plan  Times per week: 2-5  Current Treatment Recommendations: Transfer Training, Home Exercise Program, Functional Mobility Training, Cognitive Reorientation  Safety Devices  Type of devices: Call light within reach, Chair alarm in place, Left in chair, Telesitter in use, Nurse notified(pt reclined)           AM-PAC Score  AM-PAC Inpatient Mobility Raw Score : 7  AM-PAC Inpatient T-Scale Score : 26.42  Mobility Inpatient CMS 0-100% Score: 92.36  Mobility Inpatient CMS G-Code Modifier : CM          Goals  Short term goals  Time Frame for Short term goals: discharge  Short term goal 1: sit to/from supine CGA  Short term goal 2: bed to/from chair CGA  Short term goal 3: LE HEP x10 CGA  Patient Goals   Patient goals : unable to state       Therapy Time   Individual Concurrent Group Co-treatment   Time In 0750         Time Out 0828         Minutes 38           Timed Code Treatment Minutes: 28      Total Treatment Minutes:  38    This note will serve as a discharge summary if patient is discharged from hospital before next treatment session.        Yuli Domingo, PT

## 2019-05-07 NOTE — PROGRESS NOTES
Attempt to see pt. For buttock wound c/s, pt. Up in chair. Requested Hugh Reid to call when pt. Returns to bed.

## 2019-05-07 NOTE — PROGRESS NOTES
Patient VSS. Patient remains confused and restless. Patient pulled IV out again. Notified MD. Will continue to monitor.

## 2019-05-07 NOTE — PROGRESS NOTES
Speech Language Pathology  Assessment attempt    Referral rec'd, chart reviewed. Attempted to initiate evaluation but unable to wake pt. Will attempt again later as time permits. Will discuss with RN.     Manan Maciel, [address]  Speech-Language Pathologist  Pager [phone]

## 2019-05-07 NOTE — CARE COORDINATION
Called BEHAVIORAL MEDICINE AT [facility] and asked them to begin precert for this patient. HENS submitted.      Electronically signed by Tierra Silva RN on 5/7/2019 at 9:42 AM  [phone]

## 2019-05-07 NOTE — PROGRESS NOTES
Speech Language Pathology  Facility/Department: [address] [facility] EVALUATION    NAME: Eduar Mcwilliams  : 3/3/1929  MRN: [id number]    ADMISSION DATE: 2019  ADMITTING DIAGNOSIS: has Atrial fibrillation ([address] Hyperlipidemia; Hypertension; Renal insufficiency; Cardiomyopathy ([address] Pacemaker; Renal failure, acute on chronic ([address] Pubic ramus fracture ([address] Atrial flutter ([address] Congestive heart failure ([address] Acute on chronic systolic CHF (congestive heart failure), NYHA class 3 ([address] Chronic cor pulmonale (HCC); S/P mitral valve repair; and Traumatic closed fracture of C1 vertebra with minimal displacement, initial encounter ([facility] on their problem list.  ONSET DATE: 19    Recent Chest Xray 19  Stable cardiomegaly.  Lungs are grossly clear.  No evidence of an acute   injury. CT cervical spine 19  Nondisplaced fracture involving the base of the dens.       Fracture involving the posterior right ring of C1 which appears slightly   widened and there is questionable widening at the atlantoaxial interval.   Recommend further evaluation of the cervical spine with MRI to assess for   cord or ligamentous injury. CT of head 19   No acute hemorrhage, hydrocephalus, or mass effect. Date of Eval: 2019  Evaluating Therapist: Rosalio De Oliveira    Current Diet level:  Current Diet : NPO  Current Liquid Diet : NPO      Primary Complaint  Patient Complaint: pt did not state     Pain: Pt did not respond when questioned     Reason for Referral  Eduar Mcwilliams was referred for a bedside swallow evaluation to assess the efficiency of her swallow function, identify signs and symptoms of aspiration and make recommendations regarding safe dietary consistencies, effective compensatory strategies, and safe eating environment. Impression  Pt lethargic with eyes closed for most of the assessment. Oral- pt unable/unwilling to follow commands for formal oral motor assessment. No obvious droop observed. Son present- reported pt always wears dentures when eating. Attempted to place dentures in pt's mouth, but pt would not allow. Pt with slow, prolonged mastication with ice chip and cracker. Unable to assess for lingual or buccal residue as pt would not open mouth on command for inspection. Pt able to adequately close lips around edge of cup. No anterior spillage with any consistency. Pharyngeal- pt noted to produce a cough prior to po trials. Pt with one delayed cough (pt consumed 6 oz total) with trials of water by cup. Pt able to initiate swallow without difficulty with laryngeal movement observed. Unable to assess vocal quality as verbalization was very limited. No throat clearing or choking observed with any consistency. Pt consumed 3oz water uninterrupted by cup without difficulty. Dysphagia Diagnosis: Suspected needs further assessment  Dysphagia Outcome Severity Scale: Level 4: Mild moderate dysphagia- Intermittent supervision/cueing. One - two diet consistencies restricted     Treatment Plan  Requires SLP Intervention: Yes  Duration/Frequency of Treatment: 3-5x  D/C Recommendations: To be determined       Recommended Diet and Intervention  Diet recommendation- Full liquid/thin-Make NPO if s/s aspiration emerge and alert SLP  Only allow to eat when adequately alert  Pt may require 1:1 assistance if con't to be lethargic  Recommended Form of Meds: PO  Recommendations: Dysphagia treatment  Therapeutic Interventions: Diet tolerance monitoring;Patient/Family education(ongoing assessement)    Compensatory Swallowing Strategies  Upright as possible for all oral intake  Only allow to eat when adequately alert    Treatment/Goals  1- The patient will tolerate recommended diet without observed clinical signs of aspiration    2- The pt/family will demonstrate understanding of swallowing recommendations and concerns.   5/7-   The pt and son were educated to purpose of the visit, concerns by cup without difficulty. Prognosis  Prognosis  Prognosis for safe diet advancement: fair  Barriers to reach goals: cognitive deficits;behavior  Individuals consulted  Consulted and agree with results and recommendations: Patient; Family member;RN  Family member consulted: son    Education  Patient Education: Role of SLP  Patient Education Response: No evidence of learning  Safety Devices in place: Yes  Type of devices: Call light within reach         Therapy Time  SLP Individual Minutes  Time In: 1314  Time Out: [phone]  Minutes: 21          Pt's goal:pt unable to state       Plan:  Continue goals per POC  Recommended diet:full liquid/thin-Make NPO if s/s aspiration emerge and alert SLP  Only allow pt to eat when adequately alert- may require 1:1 assistance if remains lethargic  Will attempt to re-assess again tomorrow for possible diet upgrade  Total treatment time:21  Pt's discharge plan:pt unable to state   Discharge Plan: To be determined closer to discharge  Discussed with RN, Heydi Negrete within reach.        Alba Ashby, [address] [facility] [place], [facility]  Pg # [phone]  This document will serve as a discharge summary if pt discharge before next treatment   session

## 2019-05-07 NOTE — PROGRESS NOTES
PGY-1 Resident Progress Note  [facility]    Admit Date: 5/5/2019       CC: C1 fracture    Overnight Events:  No acute events overnight. PT responsive to sternal rub and able to get a response with some coxing. She sleeps and keeps her eyes closed when talking. No family at bedside. Family Hx:   Family History   Problem Relation Age of Onset    Other Mother     Other Father        Scheduled Medications:    melatonin  2 mg Oral Once    furosemide  20 mg Intravenous Daily    sodium chloride flush  10 mL Intravenous 2 times per day    warfarin (COUMADIN) daily dosing (placeholder)   Other See Admin Instructions    [START ON 5/10/2019] levothyroxine  12.5 mcg Intravenous QAM AC    And    [START ON 5/11/2019] sodium chloride (PF)  5 mL Intravenous Daily    metoprolol  2.5 mg Intravenous Q6H      PRN Medications: sodium chloride flush, magnesium hydroxide, ondansetron, perflutren lipid microspheres  Diet: Diet NPO Effective Now    Continuous Infusions:   amiodarone 450mg/250ml D5W infusion 0.5 mg/min (05/06/19 2024)       PHYSICAL EXAM:  /84   Pulse 89   Temp 96.7 °F (35.9 °C) (Axillary)   Resp 16   SpO2 93%   No results for input(s): POCGLU in the last 72 hours. Intake/Output Summary (Last 24 hours) at 5/7/2019 1443  Last data filed at 5/7/2019 0501  Gross per 24 hour   Intake 0 ml   Output 610 ml   Net -610 ml       Physical Exam   Constitutional: No distress. HENT:   Head: Atraumatic. Right Ear: External ear normal.   Left Ear: External ear normal.   Eyes: Right eye exhibits no discharge. Left eye exhibits no discharge. No scleral icterus. Neck: Neck supple. No tracheal deviation present. No thyromegaly present. Cardiovascular: Normal rate. Exam reveals no friction rub. No murmur heard. irreg irreg HR   Pulmonary/Chest: Effort normal and breath sounds normal. No respiratory distress. Abdominal: Soft. Bowel sounds are normal. She exhibits no distension.  There is no 3210  -- Lead model 5076 ([employer]tronic) and Lead model 1258T/86  - pain control with tylenol and lidocaine patch     AMS  Likely 2/2 to ativan. - hold ativan and dilaudid  - will give seroquel (12.5mg) tonight as pt may be sundowning    KENDRA on CKD (improving)  Baseline creatinine around 1.6. Creatinine on presentation was 2.3. Received 250 cc bolus. - f/u BMP   - hold IVF as may be cardiorenal     Elevated Troponin (resolved)  On admission 0.24 then downtrending. No new EKG changes.     HFrEF - not in exacerbation   ECHO in 2015 showed EF 30-35%. Patient has BiV ICD placed in 2013. At home on lasix 40 mg daily 5 times weekly. At home on coreg 25 mg BID. - continue metoprolol 2.6mg IV q6hr  - lasix 20 mg IV daily     AF on warfarin   May be 2/2 to amiodarone acting as cytochrome P450 inhibitor causing elevation in warfarin vs cardiohepatic.  - continue amiodarone IV  - INR decreased from 10.45 to 3.39  -- hold warfarin   -- gave oral vit K      B/L LE edema with multiple bullous-like lesions and possible surrounding cellulitis   Patient appears to have severe LE edema with chronic venous stasis. Multiple bullous-like lesions with eruptions and surrounding erythema concerning for cellulitis. - wound care  - lasix as mentioned above     CAD  - continue statin      HTN  - continue medications listed above      Hypothyroidism  - continue IV synthroid when appropriate   - TSH w/reflex 6.75/1.8 vs 6.97/1.2 (5/4/17)    PT: AM-PAC 7/24  OT: AM-PAC 8/24  CM: [facility] precert at [facility].    Code Status: Limited  FEN: IVF: none; Diet NPO Effective Now  PPX: Hold AC for now as INR supratheraputic  DISPO: GMF      This patient will be discussed with attending, Ro Juárez MD.    Shani Miller M.D.    Internal Medicine Resident, PGY-1  Contact via [employer]  5/7/2019, 2:43 PM

## 2019-05-07 NOTE — PROGRESS NOTES
Clinical Pharmacy Progress Note    Admit date: 5/5/2019    Subjective/Objective:  80 y.o. female with PMH significant for AF (on warfarin), HFrEF, ICD, HTN. Admitted with unwitnessed fall at home resulting in C1 fracture. Transferred to [facility] from [facility] for neurosurgery evaluation. Per neurosurgery, surgical intervention is not recommended for her cervical spine fractures. Patient is on chronic anticoagulation with warfarin for AF. Pharmacy is asked to dose warfarin per Dr. Napoleon Elliott. Home anticoagulation regimen: 2 mg daily except 1 mg on Tues/Thurs  Warfarin   · Goal INR = 2-3  · Managed as an outpatient by Rocco Cadet  · Last INR check on 2/14 was 2.9  · Of note, patient has missed 3 appointments with Talat Rogers according to documentation in Epic and usually has a visit every 5 weeks. Labs:  Lab Results   Component Value Date    HGB 11.2 (L) 05/07/2019    HCT 35.1 (L) 05/07/2019     05/07/2019       Date INR Warfarin Dose Notes   5/5 8.74 --    5/6 10.45 -- Vitamin K 2 mg IV x 1 dose given   5/7 3.39 --        Assessment/Plan:  1) Anticoagulation  · INR decreased to 3.39 today. Patient received vitamin K 2 mg IV yesterday. · Per outpatient notes, warfarin has been held in the past when INR was 3.30. Will hold warfarin again today, but will likely be able to resume tomorrow. · Warfarin placeholder inserted into the MAR. · Daily INR will be monitored and dose adjustments made as needed. Please call with any questions.   Aldo Arenas, PharmD, [facility]  [phone]  5/7/2019 2:57 PM

## 2019-05-08 NOTE — CARE COORDINATION
Called and left a message with BEHAVIORAL MEDICINE AT [facility] to check on the status of precert and to give them updates.      Electronically signed by Tameka Briceño RN on 5/8/2019 at 12:37 PM  [phone]

## 2019-05-08 NOTE — PROGRESS NOTES
Patient oriented to self only. VSS. No changes in neuro status overnight. Had solid bowel movement. Continuing to pull at tele wires and attempting to get out of bed. Using specialty mattress and turned frequently. Complete bed bath given this shift. Fall precautions in place, will continue to monitor.

## 2019-05-08 NOTE — PROGRESS NOTES
There is no tenderness. Musculoskeletal: She exhibits no edema, tenderness or deformity. Neurological:   AAO x1 (self)   Skin: Capillary refill takes less than 2 seconds. No rash noted. She is not diaphoretic. No erythema. No pallor. Psychiatric:   Confused     LABS:  Recent Labs     05/06/19  0600 05/07/19  0545 05/08/19  0534   WBC 9.0 8.2 8.6   HGB 12.2 11.2* 11.4*   HCT 37.6 35.1* 35.4*   * 136 136                                                                    Recent Labs     05/06/19  0600 05/07/19  0545 05/08/19  0534    142 142   K 5.0 4.8 4.4    105 105   CO2 20* 23 22   BUN 51* 52* 50*   CREATININE 2.4* 2.2* 2.2*   GLUCOSE 73 87 101*     No results for input(s): AST, ALT, ALB, BILITOT, ALKPHOS in the last 72 hours. No results for input(s): TROPONINI in the last 72 hours. No results for input(s): BNP in the last 72 hours. No results for input(s): CHOL, HDL in the last 72 hours. Invalid input(s): LDLCALCU  Recent Labs     05/06/19  0605 05/07/19  0544 05/08/19  0534   INR 10.45* 3.39* 2.49*       Imaging:          CT HEAD WO CONTRAST   Final Result     No acute hemorrhage, hydrocephalus, or mass effect. Assessment & Plan: Anabel Kamara is a 80 y.o. female with PMH of AF (on warfarin), HFrEF (30-35%) with Biventricular ICD and HTN who presented to [facility] after an unwitnessed fall and admitted for a C1 fracture. C1 fracture in setting of unwitnessed fall   CT C-spine showed nondisplaced dens fracture of C1. CT head did not show any acute findings. Neurosurgery consulted; no sx intervention now. BiV pacemaker not MRI compatible/  -- [place]-J collar for 6-12 wks then dynamic flexion/extenasion spine film after 6 wks  - q4h neuro checks  - pain control with tylenol and lidocaine patch     AMS  Likely 2/2 to ativan.   - hold ativan and dilaudid  - continue seroquel BID  - pt able to tolerate assisted PO intake; continue full liquid diet    KENDRA on CKD

## 2019-05-09 NOTE — PROGRESS NOTES
PGY-1 Resident Progress Note  [facility]    Admit Date: 5/5/2019       CC: C1 fracture    Overnight Events:  No acute events overnight. Pt seen at bedside resting comfortably but fidigiting with her sheets. She is AAO x1 (self); unchanged from prior. Family Hx:   Family History   Problem Relation Age of Onset    Other Mother     Other Father        Scheduled Medications:    QUEtiapine  12.5 mg Oral BID    warfarin  2 mg Oral Once per day on Sun Mon Wed Fri Sat    warfarin  1 mg Oral Once per day on Tue Thu    amiodarone  200 mg Oral Once per day on Mon Tue Wed Thu Fri    melatonin  2 mg Oral Once    carvedilol  25 mg Oral BID WC    furosemide  40 mg Oral Daily    levothyroxine  25 mcg Oral QAM AC    sodium chloride flush  10 mL Intravenous 2 times per day      PRN Medications: sodium chloride flush, magnesium hydroxide, ondansetron, perflutren lipid microspheres  Diet: DIET FULL LIQUID;  Dietary Nutrition Supplements: Standard High Calorie Oral Supplement    Continuous Infusions:      PHYSICAL EXAM:  /74   Pulse 80   Temp 97.6 °F (36.4 °C) (Oral)   Resp 16   SpO2 92%   No results for input(s): POCGLU in the last 72 hours. Intake/Output Summary (Last 24 hours) at 5/9/2019 0758  Last data filed at 5/9/2019 0408  Gross per 24 hour   Intake 25 ml   Output 1015 ml   Net -990 ml       Physical Exam   Constitutional: She appears distressed. HENT:   Head: Atraumatic. Right Ear: External ear normal.   Left Ear: External ear normal.   Eyes: Right eye exhibits no discharge. Left eye exhibits no discharge. No scleral icterus. Neck: Neck supple. No tracheal deviation present. No thyromegaly present. Cardiovascular: Normal rate. Exam reveals no friction rub. No murmur heard. irreg irreg HR   Pulmonary/Chest: Effort normal and breath sounds normal. No stridor. No respiratory distress. She has no wheezes. Abdominal: Soft. Bowel sounds are normal. She exhibits no distension.  There is no tenderness. Musculoskeletal: She exhibits no edema, tenderness or deformity. Neurological:   AAO x1 (self)   Skin: Capillary refill takes less than 2 seconds. No rash noted. She is not diaphoretic. No erythema. No pallor. Psychiatric:   Confused. But able to state her name. Answers \"I don't know\" to most questions. LABS:  Recent Labs     05/07/19  0545 05/08/19  0534 05/09/19  0553   WBC 8.2 8.6 7.8   HGB 11.2* 11.4* 11.6*   HCT 35.1* 35.4* 36.6    136 133*                                                                    Recent Labs     05/07/19  0545 05/08/19  0534 05/09/19  0553    142 145   K 4.8 4.4 4.2    105 105   CO2 23 22 26   BUN 52* 50* 41*   CREATININE 2.2* 2.2* 2.2*   GLUCOSE 87 101* 87     No results for input(s): AST, ALT, ALB, BILITOT, ALKPHOS in the last 72 hours. No results for input(s): TROPONINI in the last 72 hours. No results for input(s): BNP in the last 72 hours. No results for input(s): CHOL, HDL in the last 72 hours. Invalid input(s): LDLCALCU  Recent Labs     05/07/19  0544 05/08/19  0534 05/09/19  0553   INR 3.39* 2.49* 2.93*       Imaging:          CT HEAD WO CONTRAST   Final Result     No acute hemorrhage, hydrocephalus, or mass effect. Assessment & Plan: Saray Gonzales is a 80 y.o. female with PMH of AF (on warfarin), HFrEF (30-35%) with Biventricular ICD and HTN who presented to [facility] after an unwitnessed fall and admitted for a C1 fracture. C1 fracture in setting of unwitnessed fall   CT C-spine showed nondisplaced dens fracture of C1. CT head did not show any acute findings. Neurosurgery consulted; no sx intervention now. BiV pacemaker not MRI compatible. - neurosx rec: [place]-J collar for 6-12 wks then dynamic flexion/extenasion spine film after 6 wks  - q4h neuro checks  - pain control with tylenol and lidocaine patch     AMS (improving)  Likely 2/2 to ativan and dilaudid.   - continue seroquel BID  - pt able to tolerate assisted PO intake; continue full liquid diet  - f/u repeat UA; UA from 5/5/19 (-) butpt with patel since 5/4/19    KENDRA on CKD (stable)  Baseline creatinine around 1.6. Creatinine on presentation was 2.3. Received 250 cc bolus. - Cre stable @ 2.2; this may be her new baseline  - hold IVF as may be cardiorenal  - continue home lasix 40mg daily     HFrEF - not in exacerbation   ECHO in 2015 showed EF 30-35%. Patient has BiV ICD placed in 2013. At home on lasix 40 mg daily 5 times weekly. At home on coreg 25 mg BID. - continue home coreg 25mg  - continue lasix 40mg qD     AF on warfarin   May be 2/2 to amiodarone acting as cytochrome P450 inhibitor causing elevation in warfarin vs cardiohepatic.  - continue amiodarone PO  - INR therapeutic (2.93)  -- pharmacy to dose warfarin     B/L LE edema with multiple bullous-like lesions and possible surrounding cellulitis   Patient appears to have severe LE edema with chronic venous stasis. Multiple bullous-like lesions with eruptions and surrounding erythema concerning for cellulitis. - wound care  - lasix as mentioned above     CAD  - continue statin      HTN  - continue medications listed above      Hypothyroidism  TSH w/ reflex 6.75/1.8 vs 6.97/1.2 (5/4/17)  - continue IV synthroid when appropriate     Elevated Troponin (resolved)  On admission 0.24 then downtrending. No new EKG changes.     Macrocytic anemia  RBC 11.6 with   - f/u folate and vit B12      PT: AM-PAC 7/24  OT: AM-PAC 8/24  CM: started precert at [facility]    Code Status: Limited  FEN: IVF: none; DIET FULL LIQUID;  Dietary Nutrition Supplements: Standard High Calorie Oral Supplement  PPX: Hold AC for now as INR supratheraputic  DISPO: GMF      This patient will be discussed with attending, Carole Clarke MD.    Charisse Serna M.D.    Internal Medicine Resident, PGY-1  Contact via [employer]  5/9/2019, 7:58 AM

## 2019-05-09 NOTE — PROGRESS NOTES
Pt. Re-checked, found with gross smelling purulent drainage bwetween toes with thick \"goo\" from exudating venous ulcers scattered over dorsal feet, scattered blood blisters as well. Previous adaptic dressings. Recommend calcium alginate ag and wraps. Orders placed, dressings applied after thorough cleansing, finger nails long/fagged/dirt filled -  trimmed and cleansed.

## 2019-05-09 NOTE — PROGRESS NOTES
Due to physician ordering a stat UA, the patel that was placed on admission was removed and new 16fr catheter was placed using sterile technique. Urine returned and specimen collected. Patient tolerated procedure fairly well.

## 2019-05-09 NOTE — PROGRESS NOTES
No acute events overnight. Pt continues to pull at lines and is restless. Pt unable to cooperate on exam or answer questions. [place] has adequate output. Pt refusing most oral intake, will only take a few sips or bites.

## 2019-05-09 NOTE — PROGRESS NOTES
Clinical Pharmacy Progress Note    Admit date: 5/5/2019    Subjective/Objective:  80 y.o. female with PMH significant for AF (on warfarin), HFrEF, ICD, HTN. Admitted with unwitnessed fall at home resulting in C1 fracture. Transferred to [facility] from [facility] for neurosurgery evaluation. Per neurosurgery, surgical intervention is not recommended for her cervical spine fractures. Patient is on chronic anticoagulation with warfarin for AF. Pharmacy is asked to dose warfarin per Dr. Kenny Gan. Home anticoagulation regimen:   2 mg daily except 1 mg on Tues/Thurs  · Managed as an outpatient by Rocco Cadet  · Last INR check on 2/14 was 2.9  · Of note, patient has missed 3 appointments with Talat W Cameron  according to documentation in Epic and usually has a visit every 5 weeks. Labs:  Lab Results   Component Value Date    HGB 11.6 (L) 05/09/2019    HCT 36.6 05/09/2019     (L) 05/09/2019       Date INR Warfarin Dose Notes   5/5 8.74 --    5/6 10.45 -- Vitamin K 2 mg IV given   5/7 3.39 --    5/8 2.49 2mg    5/9 2.93 HOLD        Assessment/Plan:  1) Anticoagulation: Afib (goal INR = 2-3)   · INR 2.93 today. Increased significantly from yesterday. Due to increase in INR will HOLD dose tonight then continue 2mg daily except 1mg Tues/Thurs. Patient has been historically very stable on this dose per outpatient anticoagulation records. · INR was supratherapeutic upon admission for unknown reason. Patient is on amiodarone at home which can significantly increase INR, however per records she has been on this medication since at least 2011 so would not expect this to have an effect. · Daily INR will be monitored and dose adjustments made as needed. Please call with any questions.   Rolan Dee, PharmD  [phone]  5/9/2019 10:46 AM

## 2019-05-09 NOTE — PROGRESS NOTES
Occupational Therapy  Facility/Department: [facility] 5T ORTHO/NEURO  Daily Treatment Note  NAME: Anabel Kamara  : 3/3/1929  MRN: [id number]    Date of Service: 2019    Discharge Recommendations: Anabel Kamara scored a  on the AM-PAC ADL Inpatient form. Current research shows that an AM-PAC score of 17 or less is typically not associated with a discharge to the patient's home setting. Based on the patients AM-PAC score and their current ADL deficits, it is recommended that the patient have 3-5 sessions per week of Occupational Therapy at d/c to increase the patients independence. OT Equipment Recommendations  Equipment Needed: No  Other: defer to [facility]     Assessment   Performance deficits / Impairments: Decreased cognition;Decreased functional mobility ; Decreased ADL status; Decreased safe awareness  Assessment: Pt demo slight increase in alertness /participation with Max v.cues -Pt very drowsy and easily falls asleep. Pt needing Dep assist with bed mobility and transfers - able to WB BLEs. Pt needing Mod A with sitting balance 2/2 L sided lean. Pt would benefit from ongoing OT at d/c to maximize functional level. Pt's significant dementia will limite carryover   Treatment Diagnosis: impaired ADLs/ functional tranfers 2/2 Fall   Prognosis: Fair;Poor  REQUIRES OT FOLLOW UP: Yes  Safety Devices  Safety Devices in place: Yes  Type of devices: Call light within reach; Chair alarm in place;Nurse notified; Left in chair         Patient Diagnosis(es): There were no encounter diagnoses. has a past medical history of Atrial fibrillation ([address] Cardiomyopathy ([address] CHF (congestive heart failure) ([address] Heart murmur, Hyperlipidemia, Hypertension, Mitral insufficiency, and Pacemaker. has a past surgical history that includes Diagnostic Cardiac Cath Lab Procedure; Coronary angioplasty; Mitral valve surgery;  Total hip arthroplasty; pacemaker placement (11); joint replacement; eye surgery x 2 )  Transfer Comments: fearful / resistive to mobility / movement ? dementia      Cognition  Overall Cognitive Status: Exceptions  Arousal/Alertness: Inconsistent responses to stimuli  Following Commands: Inconsistently follows commands  Attention Span: Difficulty attending to directions  Memory: Decreased long term memory;Decreased short term memory;Decreased recall of biographical Information  Safety Judgement: Decreased awareness of need for safety  Problem Solving: Decreased awareness of errors  Insights: Not aware of deficits  Initiation: Requires cues for all  Sequencing: Requires cues for all  Cognition Comment: significant dementia at baseline - was able to ambulate / climb stairs      Plan  This note will serve as a discharge summary if patient is discharged from hospital before next treatment session. Plan  Times per week: 2-5x  Times per day: Daily  Current Treatment Recommendations: Functional Mobility Training, Self-Care / ADL, Cognitive Reorientation, Patient/Caregiver Education & Training, Safety Education & Training    AM-PAC Score  AM-PAC Inpatient Daily Activity Raw Score: 8  AM-PAC Inpatient ADL T-Scale Score : 22.86  ADL Inpatient CMS 0-100% Score: 85.69  ADL Inpatient CMS G-Code Modifier : CM    Goals  Short term goals  Time Frame for Short term goals: at d/c   Short term goal 1: Sit at EOB x7 mins with Supervision - not met   Short term goal 2: Grooming with Min A and Mod v.cues  - not met   Short term goal 3: Commode transfers with Mod A x1 - not met   Short term goal 4: Follow 1 step directions > 50 % of task.  - not met      Therapy Time   Individual Concurrent Group Co-treatment   Time In 1445         Time Out 1516         Minutes 31              Timed Code Treatment Minutes:   31 mins     Total Treatment Minutes:  31 mins       Nancy Kessler, OT

## 2019-05-09 NOTE — PROGRESS NOTES
Physical Therapy  Facility/Department: [facility] 5T ORTHO/NEURO  Daily Treatment Note  NAME: Ulises Parekh  : 3/3/1929  MRN: [id number]    Date of Service: 2019    Discharge Recommendations: Ulises Parekh scored a /24 on the AM-PAC short mobility form. Current research shows that an AM-PAC score of 17 or less is typically not associated with a discharge to the patient's home setting. Based on the patients AM-PAC score and their current functional mobility deficits, it is recommended that the patient have 3-5 sessions per week of Physical Therapy at d/c to increase the patients independence. PT Equipment Recommendations  Equipment Needed: (defer)    Patient Diagnosis(es): There were no encounter diagnoses. has a past medical history of Atrial fibrillation ([address] Cardiomyopathy ([address] CHF (congestive heart failure) ([address] Heart murmur, Hyperlipidemia, Hypertension, Mitral insufficiency, and Pacemaker. has a past surgical history that includes Diagnostic Cardiac Cath Lab Procedure; Coronary angioplasty; Mitral valve surgery; Total hip arthroplasty; pacemaker placement (11); joint replacement; eye surgery (Bilateral); and pacemaker placement (13). Restrictions  Position Activity Restriction  Other position/activity restrictions: up with assist, Koko FIGUEROA at all times  Subjective   General  Chart Reviewed: Yes  Additional Pertinent Hx: 80 y.o. female with PMH significant for AF (on warfarin), HFrED, ICD, HTN. Admitted with unwitnessed fall at home resulting in C1 fracture; neurosurgery consult-Koko FIGUEROA. \"I want to go home. \"  Family / Caregiver Present: Yes(son)  Referring Practitioner: Yue Wu MD  Subjective  Subjective: Found in bed, taking her gown off. Pt's son in room. Reports pt has dementia & has gone downhill since turning 89. Pt was ambulatory at home though with cane & could go up/down stairs.   Pain Screening  Patient Currently in Pain: Denies  Vital Signs  Patient Currently in Pain: Denies       Orientation  Orientation  Overall Orientation Status: Impaired(able to state last name & birthdate- month & date but not year; poorly follows commands)     Objective   Bed mobility  Supine to Sit: Dependent/Total(of 2)  Scooting: Maximal assistance  Transfers  Sit to Stand: Dependent/Total(max A of 2 to stand to walker)  Stand Pivot Transfers: Dependent/Total(attempted with walker initially but pt unable to take any steps- switched to stand pivot with max A of 2 with therapists trying to A pt to take steps but not very successful)  Ambulation  Ambulation?: No     Balance  Comments: Sat EOB approx 10 min with mod A majority of time. Leaning significantly L & post. Eyes closed most of time but would open occasionally upon command. [place] J collar applied in supine with max A. Once in chair, pt leaning heavily to L as well- propped with pillows on L side to avoid leaning. Foot stool under feet. Assessment   Body structures, Functions, Activity limitations: Decreased functional mobility ; Decreased balance  Assessment: Pt in hospital due to C1 fracture. Currently requiring heavy A of 2 to get OOB & stand. Unable to walk yet. Was ambulatory at home with cane, therefore is below her baseline. Decreased cognition evident throughout session. Will continue to trial therapy. Treatment Diagnosis: mobility impairment due to fall  Patient Education: Pt educated on PT role and she was unable to verbalize understanding. REQUIRES PT FOLLOW UP: Yes  Activity Tolerance  Activity Tolerance: Patient limited by cognitive status; Patient limited by endurance                                                     AM-PAC Score  AM-PAC Inpatient Mobility Raw Score : 6  AM-PAC Inpatient T-Scale Score : 23.55  Mobility Inpatient CMS 0-100% Score: 100  Mobility Inpatient CMS G-Code Modifier : CN          Goals- all goals ongoing  Short term goals  Time Frame for Short term goals: discharge  Short term goal 1: sit to/from supine CGA  Short term goal 2: bed to/from chair CGA  Short term goal 3: LE HEP x10 CGA  Patient Goals   Patient goals : unable to state    Plan    Plan  Times per week: 2-5  Current Treatment Recommendations: Transfer Training, Home Exercise Program, Functional Mobility Training, Cognitive Reorientation  Safety Devices  Type of devices: Call light within reach, Chair alarm in place, Left in chair, Nurse notified(son in room)     Therapy Time   Individual Concurrent Group Co-treatment   Time In 1446         Time Out 1517         Minutes 31              Timed Code Treatment Minutes:   31    Total Treatment Minutes:  [facility]  If patient is discharged prior to next treatment, this note will serve as the discharge summary.

## 2019-05-10 NOTE — CARE COORDINATION
Received a call from [facility] and they are not in network either.      Electronically signed by Candy Rios RN on 5/10/2019 at 5:34 PM  [phone]

## 2019-05-10 NOTE — PROGRESS NOTES
Clinical Pharmacy Progress Note    Admit date: 5/5/2019    Interval update: Patient had blood in urine in patel bag, started on cefdinir. Subjective/Objective:  80 y.o. female with PMH significant for AF (on warfarin), HFrEF, ICD, HTN. Admitted with unwitnessed fall at home resulting in C1 fracture. Transferred to [facility] from [facility] for neurosurgery evaluation. Per neurosurgery, surgical intervention is not recommended for her cervical spine fractures. Patient is on chronic anticoagulation with warfarin for AF. Pharmacy is asked to dose warfarin per Dr. Tootie Michel. Home anticoagulation regimen:   2 mg daily except 1 mg on Tues/Thurs  · Managed as an outpatient by Rocco Cadet  · Last INR check on 2/14 was 2.9  · Of note, patient has missed 3 appointments with Talat Rogers according to documentation in Epic and usually has a visit every 5 weeks. Labs:  Lab Results   Component Value Date    HGB 11.0 (L) 05/10/2019    HCT 33.9 (L) 05/10/2019     (L) 05/10/2019       Date INR Warfarin Dose Notes   5/5 8.74 --    5/6 10.45 -- Vitamin K 2 mg IV given   5/7 3.39 --    5/8 2.49 2mg    5/9 2.93 Instructed to HOLD  1mg given Per RN override \"not schedule to start until 5/14-mistake\"   5/10 4.10 HOLD        Assessment/Plan:  1) Anticoagulation: Afib (goal INR = 2-3)   · INR 4.10 today. RN was supposed to HOLD dose yesterday, however overrode and still gave 1mg with comment \"not schedule to start until 5/14-mistake. \"  · Due to increase in INR will HOLD dose tonight and enter warfarin placeholder. Will re-start warfarin when INR trends down and is therapeutic. · INR was supratherapeutic upon admission for unknown reason. Patient is on amiodarone at home which can significantly increase INR, however per records she has been on this medication since at least 2011 so would not expect this to have an effect. LFTs are normal as well.  Patient has been historically very stable on outpatient dose per anticoagulation records. · Daily INR will be monitored and dose adjustments made as needed. Please call with any questions.   Don DegrootD  [phone]  5/10/2019 10:59 AM

## 2019-05-10 NOTE — ED PROVIDER NOTES
Attending Supervisory Note/Shared Visit   I have personally performed a face to face diagnostic evaluation on this patient. I have reviewed the mid-levels findings and agree. History and Exam by me shows a chronically ill-appearing elderly female. Patient presented to the ED after having an unwitnessed fall at home and complaining of back pain. Time of evaluation patient appeared confused, and was attempting to remove the c-collar Ellender Pettit place. Patient was moving all extremities purposefully and cranial nerves II through XII are grossly intact. Patient's workup was remarkable for a fracture of the base of the dens as well as a fracture of the right ring of C1. Neurosurgery was contacted at [facility]. and agreed to transfer the patient there for definitive care and further evaluation. Patient was transferred in stable condition. I agree with the LANDEN plan of care. Please LANDEN Note for full ED course and final disposition      Disposition:  1. Closed odontoid fracture, initial encounter ([facility]    2. Closed nondisplaced fracture of first cervical vertebra, unspecified fracture morphology, initial encounter ([facility]    3. Chronic kidney disease, unspecified CKD stage    4. Blister (nonthermal), left foot, initial encounter    5. Blister of right foot, initial encounter    6.  Leg edema          Silvia Deleon MD  Attending Emergency Physician       Silvia Deleon MD  05/09/19 2045

## 2019-05-10 NOTE — PROGRESS NOTES
No acute events overnight. Pt much calmer and picking and pulling less at lines and collar. Pt not attempting to get out of bed now. Koko FIGUEROA on and aligned. Pt being repositioned every 2 hours, on special mattress. Pt still mostly nonverbal, doesn't really follow commands, hardly opens eyes. Still refusing most oral intake, will only take a bite or two or a few sips.

## 2019-05-10 NOTE — PROGRESS NOTES
Speech Language Pathology  Dysphagia - attempt    Chart reviewed, d/w RN. Attempted to see pt however pt sleeping soundly. Pt did not awaken to verbal or tactile stim, daughter present and attempted as well. Will follow up as pt able and schedule allows. Hamilton Rodriguez M.S./[id number]  Pg.  # [phone] 1:34 PM

## 2019-05-10 NOTE — CARE COORDINATION
Received a call late last night letting me know that [facility] would not give a one time contract for BEHAVIORAL MEDICINE AT [facility]. Faxed referrals to [address] per my previous conversation with family. Will follow up with them after they have reviewed clinicals.      Electronically signed by Fidencio Gonzalez RN on 5/10/2019 at 10:04 AM  [phone]

## 2019-05-10 NOTE — PROGRESS NOTES
PGY-1 Resident Progress Note  [facility]    Admit Date: 5/5/2019       CC: C1 fracture    Overnight Events:  No acute events overnight. Pt was noted to have poor PO intake so she received a 250mL NS bolus O/N. Otherwise her UA was significant for LE (+), 20-50 WBC, and 4+ meghann; she was started on cefdinir. Pt was seen at bedside resting comfortably; responsive to painful stimuli. RN stated she is less agitated with BID Seroquel. Family Hx:   Family History   Problem Relation Age of Onset    Other Mother     Other Father        Scheduled Medications:    ciprofloxacin  500 mg Oral Daily    [START ON 5/14/2019] warfarin  1 mg Oral Once per day on Tue Thu    cefdinir  300 mg Oral Daily    HYDROCERIN   Topical BID    QUEtiapine  12.5 mg Oral BID    warfarin  2 mg Oral Once per day on Sun Mon Wed Fri Sat    amiodarone  200 mg Oral Once per day on Mon Tue Wed Thu Fri    melatonin  2 mg Oral Once    carvedilol  25 mg Oral BID WC    furosemide  40 mg Oral Daily    levothyroxine  25 mcg Oral QAM AC    sodium chloride flush  10 mL Intravenous 2 times per day      PRN Medications: sodium chloride flush, magnesium hydroxide, ondansetron, perflutren lipid microspheres  Diet: DIET FULL LIQUID;  Dietary Nutrition Supplements: Standard High Calorie Oral Supplement    Continuous Infusions:      PHYSICAL EXAM:  /86   Pulse 80   Temp 97.7 °F (36.5 °C) (Oral)   Resp 19   Ht 5' 3\" (1.6 m)   Wt 121 lb (54.9 kg)   SpO2 93%   BMI 21.43 kg/m²   No results for input(s): POCGLU in the last 72 hours. Intake/Output Summary (Last 24 hours) at 5/10/2019 0851  Last data filed at 5/10/2019 1876  Gross per 24 hour   Intake 75 ml   Output 1225 ml   Net -1150 ml       Physical Exam   Constitutional: No distress. HENT:   Head: Atraumatic. Right Ear: External ear normal.   Left Ear: External ear normal.   Eyes: Right eye exhibits no discharge. Left eye exhibits no discharge. No scleral icterus.    Neck: Neck supple. No tracheal deviation present. No thyromegaly present. Cardiovascular: Normal rate. Exam reveals no friction rub. No murmur heard. irreg irreg HR   Pulmonary/Chest: Effort normal and breath sounds normal. No stridor. No respiratory distress. She has no wheezes. Abdominal: Soft. Bowel sounds are normal. She exhibits no distension. There is no tenderness. Musculoskeletal: She exhibits no edema, tenderness or deformity. Neurological:   Unable to assess. Skin: Capillary refill takes less than 2 seconds. No rash noted. She is not diaphoretic. No erythema. No pallor. Psychiatric:   Unable to assess     LABS:  Recent Labs     05/08/19  0534 05/09/19  0553 05/10/19  0554   WBC 8.6 7.8 7.2   HGB 11.4* 11.6* 11.0*   HCT 35.4* 36.6 33.9*    133* 121*                                                                    Recent Labs     05/08/19  0534 05/09/19  0553 05/10/19  0554    145 144   K 4.4 4.2 3.7    105 106   CO2 22 26 26   BUN 50* 41* 38*   CREATININE 2.2* 2.2* 1.8*   GLUCOSE 101* 87 70     No results for input(s): AST, ALT, ALB, BILITOT, ALKPHOS in the last 72 hours. No results for input(s): TROPONINI in the last 72 hours. No results for input(s): BNP in the last 72 hours. No results for input(s): CHOL, HDL in the last 72 hours. Invalid input(s): LDLCALCU  Recent Labs     05/08/19  0534 05/09/19  0553 05/10/19  0554   INR 2.49* 2.93* 4.10*       Imaging:          CT HEAD WO CONTRAST   Final Result     No acute hemorrhage, hydrocephalus, or mass effect. Assessment & Plan: Quynh Rosenberg is a 80 y.o. female with PMH of AF (on warfarin), HFrEF (30-35%) with Biventricular ICD and HTN who presented to [facility] after an unwitnessed fall and admitted for a C1 fracture. AMS  Likely 2/2 to ativan and dilaudid. Possibly now 2/2 UTI.   - switch seroquel from BID to qD  - pt able to tolerate assisted PO intake; continue full liquid diet    UTI  - repeat UA (UA from 5/5/19 (-) but pt with patel since 5/4/19)  -- UA significant for LE with WBC 20-50, 4+ meghann  -- continue cefdinar    Closed C1 fracture in setting of unwitnessed fall   CT C-spine showed nondisplaced dens fracture of C1. CT head did not show any acute findings. Neurosurgery consulted; no sx intervention now. BiV pacemaker not MRI compatible. - neurosx rec: [place]-J collar for 6-12 wks then dynamic flexion/extenasion spine film after 6 wks  - q4h neuro checks  - pain control with tylenol     KENDRA on CKD (stable)  Baseline creatinine around 1.6. Creatinine on presentation was 2.3. Received 250 cc bolus. - Cre down to 1.8  - hold IVF as may be cardiorenal  -- however pt had received 250mL bolus last night  - will hold lasix 40mg daily tomorrow in the AM to assess UOP; may need to use 1/2 dose in the future     HFrEF - not in exacerbation   ECHO in 2015 showed EF 30-35%. Patient has BiV ICD placed in 2013. At home on lasix 40 mg daily 5 times weekly. At home on coreg 25 mg BID. - continue home coreg 25mg  - continue lasix 40mg qD     AF on warfarin   May be 2/2 to amiodarone acting as cytochrome P450 inhibitor causing elevation in warfarin vs cardiohepatic.  - continue amiodarone PO  - INR therapeutic (4.1)  -- pharmacy to dose warfarin     Supratherapeutic INR  - pharmacy to dose warfarin  - INR up to 4.1; received 1mg warfarin yesterday    B/L LE edema with multiple bullous-like lesions and possible surrounding cellulitis   Patient appears to have severe LE edema with chronic venous stasis. Multiple bullous-like lesions with eruptions and surrounding erythema concerning for cellulitis.    - wound care  -- wound noted to have purulent drainage  - lasix as mentioned above     CAD  - continue statin      HTN  - continue medications listed above      Hypothyroidism  TSH w/ reflex 6.75/1.8 vs 6.97/1.2 (5/4/17)  - continue IV synthroid when appropriate     Elevated Troponin (resolved)  On admission 0.24 then downtrending. No new EKG changes.     Macrocytic anemia  RBC 11.6 with   - folate (>20) and vit B12 (>2000)      PT: AM-PAC 7/24 --> 6/24  OT: AM-PAC 8/24 --> 8/24   [facility]    Code Status: Limited  FEN: IVF: none; DIET FULL LIQUID;  Dietary Nutrition Supplements: Standard High Calorie Oral Supplement  PPX: Hold AC for now as INR supratheraputic  DISPO: GMF      This patient will be discussed with attending, Gianna Perez MD.    Svetlana Conde M.D.    Internal Medicine Resident, PGY-1  Contact via [facility]  5/10/2019, 8:51 AM

## 2019-05-10 NOTE — PROGRESS NOTES
deltoids)  5. Fluid Accumulation-No significant fluid accumulation,    6.   Strength-Not measured    Nutrition Risk Level: High    Nutrient Needs:  · Estimated Daily Total Kcal: 6310-6246  · Estimated Daily Protein (g): 66-77  · Estimated Daily Total Fluid (ml/day): 2644-9203 ml     Nutrition Diagnosis:   · Problem: Inadequate oral intake  · Etiology: related to Insufficient energy/nutrient consumption     Signs and symptoms:  as evidenced by Intake 0-25%    Objective Information:  · Nutrition-Focused Physical Findings: pt not responding to verbal/tactile stimuli; upper/lower dentures; appears frail/thin   · Wound Type: Pressure Ulcer, Stage I  · Current Nutrition Therapies:  · Oral Diet Orders: Full Liquid   · Oral Diet intake: Refusing to eat, 0%, 1-25%  · Oral Nutrition Supplement (ONS) Orders: Standard High Calorie Oral Supplement  · ONS intake: Refused, 0%  · Anthropometric Measures:  · Ht: 5' 3\" (160 cm)   · Current Body Wt: 121 lb (54.9 kg)(need updated/actual weight)  · Ideal Body Wt: 115 lb (52.2 kg),   · BMI Classification: BMI 18.5 - 24.9 Normal Weight    Nutrition Interventions:   Start Calorie Count, Start Tube Feeding  Continued Inpatient Monitoring    Nutrition Evaluation:   · Evaluation: Goals set   · Goals: pt will tolerate EN to provide 100% of nutrition needs until PO intake can improve to meet 50% or more of patient nutrition needs    · Monitoring: Nutrition Progression, TF Intake, Mental Status/Confusion, Meal Intake, Supplement Intake      Electronically signed by Luis Marroquin RD, LD on 5/10/19 at 2:09 PM    Contact Number: [phone]

## 2019-05-11 NOTE — PROGRESS NOTES
Speech Language Pathology  Facility/Department: [facility] 5T ORTHO/NEURO  Dysphagia Daily Treatment Note    NAME: Verónica Rosas  : 3/3/1929  MRN: [id number]    Patient Diagnosis(es):   Patient Active Problem List    Diagnosis Date Noted    Traumatic closed fracture of C1 vertebra with minimal displacement, initial encounter ([facility] 2019    S/P mitral valve repair 2015    Acute on chronic systolic CHF (congestive heart failure), NYHA class 3 (HCC)     Chronic cor pulmonale (HCC)     Congestive heart failure (HCC)     Atrial flutter ([address] 2014    Renal failure, acute on chronic ([address] 2014    Pubic ramus fracture ([address] 2014    Pacemaker 2011    Cardiomyopathy [facility] 10/13/2011    Atrial fibrillation ([address] 2011    Hyperlipidemia 2011    Hypertension 2011    Renal insufficiency 2011         CXR-  Recent Chest Xray 19  Stable cardiomegaly.  Lungs are grossly clear.  No evidence of an acute   injury.      CT cervical spine 19  Nondisplaced fracture involving the base of the dens.       Fracture involving the posterior right ring of C1 which appears slightly   widened and there is questionable widening at the atlantoaxial interval.   Recommend further evaluation of the cervical spine with MRI to assess for   cord or ligamentous injury.        CT of head 19   No acute hemorrhage, hydrocephalus, or mass effect. Previous MBS: N/A    Chart reviewed. Medical Diagnosis:    Traumatic closed fracture of C1 vertebra with minimal displacement, initial encounter [facility])     Treatment Diagnosis: Dysphagia    BSE Impression (19)-   Pt lethargic with eyes closed for most of the assessment. Oral- pt unable/unwilling to follow commands for formal oral motor assessment. No obvious droop observed. Son present- reported pt always wears dentures when eating. Attempted to place dentures in pt's mouth, but pt would not allow.   Pt with slow, prolonged mastication with ice chip and cracker. Unable to assess for lingual or buccal residue as pt would not open mouth on command for inspection. Pt able to adequately close lips around edge of cup. No anterior spillage with any consistency. Pharyngeal- pt noted to produce a cough prior to po trials. Pt with one delayed cough (pt consumed 6 oz total) with trials of water by cup. Pt able to initiate swallow without difficulty with laryngeal movement observed. Unable to assess vocal quality as verbalization was very limited. No throat clearing or choking observed with any consistency. Pt consumed 3oz water uninterrupted by cup without difficulty. MBS results: N/A    Pain: Pt reported pain to RN when being positioned upon SLP arrival    Current Diet :   Diet- Full liquid/thin  Pt may require 1:1 assistance if con't to be lethargic  Recommended Form of Meds: PO    Treatment:  Pt seen bedside to address the following goals:  1- The patient will tolerate recommended diet without observed clinical signs of aspiration   5/11: Pt seen in room, upright in bed. Pt appears to be more alert and oriented than recent attempts by ST. Pt was pleasant and cooperative and followed 2 step directions. Thin liquid presented via cup and straw. No overt s/s of penetration/aspiration observed. Ice chips trialed with observed bolus holding without intent to masticate, no overt s/s of penetration/aspiration. RN administered pt's meds crushed in puree. Pt demonstrated timely AP transit with puree during med administration, as well as during PO trials of Magic Cup and JellO. Cervical collar decreased ROM of mandible for PO intake and mastication. Regular cracker was trialed and sufficient mastication observed. Pt's level of alertness decreased over time. D/t fluctuating level of alertness, recommend puree/thin diet. Continue goal  2- The pt/family will demonstrate understanding of swallowing recommendations and concerns.   5/7-   The pt and son were educated to purpose of the visit, concerns for aspiration, swallowing strategies, diet recommendations, rational for diet recommendation and possibility of being made NPO if s/s aspiration emerge. The so stated comprehension. The pt demonstrated no evidence of comprehension. con't goal   5/11: Pt educated on results of session and recommendation for puree/thin diet. Pt verbalizes understanding, questioning comprehension. No family present. Patient/Family/Caregiver Education:  . See above. Compensatory Strategies:  Upright as possible for all oral intake  Only allow to eat when adequately alert     Plan:  Continued daily Dysphagia treatment with goals per  plan of care. Diet recommendations: Puree/Thin when alert  DC recommendation: TBD closer to d/c  Treatment: 22  D/W nursing  Needs met prior to leaving room, call button in reach. Yimi Nicole M.A. CF-SLP [id number]  Speech-Language Pathologist     Pg.  # [id number]    If patient is discharged prior to next treatment, this note will serve as the discharge summary

## 2019-05-11 NOTE — PROGRESS NOTES
PGY-1 Resident Progress Note  [facility]    Admit Date: 5/5/2019       CC: C1 fracture    Overnight Events:  No acute events overnight. However since the removal of the patel the pt has been unable to void without straight cath; 2x yesterday. Since decreased seroquel from BID to morning, she is more alert but still oriented x1 (self). Spoke with pt daughter yesterday and pt seems to be returning to her baseline. Pt seen at bedside resting comfortably. She denies any pain. Family Hx:   Family History   Problem Relation Age of Onset    Other Mother     Other Father        Scheduled Medications:    bethanechol  5 mg Oral TID    tamsulosin  0.4 mg Oral Daily    megestrol  20 mg Oral Daily    warfarin (COUMADIN) daily dosing (placeholder)   Other See Admin Instructions    QUEtiapine  12.5 mg Oral Nightly    cefdinir  300 mg Oral Daily    HYDROCERIN   Topical BID    amiodarone  200 mg Oral Once per day on Mon Tue Wed Thu Fri    melatonin  2 mg Oral Once    carvedilol  25 mg Oral BID WC    [Held by provider] furosemide  40 mg Oral Daily    levothyroxine  25 mcg Oral QAM AC    sodium chloride flush  10 mL Intravenous 2 times per day      PRN Medications: sodium chloride flush, magnesium hydroxide, ondansetron, perflutren lipid microspheres  Diet: Dietary Nutrition Supplements: Standard High Calorie Oral Supplement, Frozen Oral Supplement  DIET DYSPHAGIA I PUREED; Continuous Infusions:      PHYSICAL EXAM:  /80   Pulse 87   Temp 97.7 °F (36.5 °C) (Oral)   Resp 20   Ht 5' 3\" (1.6 m)   Wt 164 lb 0.4 oz (74.4 kg)   SpO2 93%   BMI 29.06 kg/m²   No results for input(s): POCGLU in the last 72 hours. Intake/Output Summary (Last 24 hours) at 5/11/2019 1318  Last data filed at 5/11/2019 0501  Gross per 24 hour   Intake 25 ml   Output 950 ml   Net -925 ml       Physical Exam   Constitutional: No distress. HENT:   Head: Atraumatic.    Right Ear: External ear normal.   Left Ear: External ear normal.   Eyes: Right eye exhibits no discharge. Left eye exhibits no discharge. No scleral icterus. Neck: Neck supple. No tracheal deviation present. No thyromegaly present. Cardiovascular: Normal rate. Exam reveals no friction rub. No murmur heard. irreg irreg HR   Pulmonary/Chest: Effort normal and breath sounds normal. No stridor. No respiratory distress. She has no wheezes. Abdominal: Soft. Bowel sounds are normal. She exhibits no distension. There is no tenderness. Musculoskeletal: She exhibits no edema, tenderness or deformity. Neurological:   Unable to assess. Skin: Capillary refill takes less than 2 seconds. No rash noted. She is not diaphoretic. No erythema. No pallor. Psychiatric:   Unable to assess     LABS:  Recent Labs     05/09/19  0553 05/10/19  0554 05/11/19  0535   WBC 7.8 7.2 7.2   HGB 11.6* 11.0* 11.0*   HCT 36.6 33.9* 33.9*   * 121* 121*                                                                    Recent Labs     05/09/19  0553 05/10/19  0554 05/11/19  0535    144 148*   K 4.2 3.7 3.9    106 108   CO2 26 26 20*   BUN 41* 38* 31*   CREATININE 2.2* 1.8* 1.9*   GLUCOSE 87 70 88     No results for input(s): AST, ALT, ALB, BILITOT, ALKPHOS in the last 72 hours. No results for input(s): TROPONINI in the last 72 hours. No results for input(s): BNP in the last 72 hours. No results for input(s): CHOL, HDL in the last 72 hours. Invalid input(s): LDLCALCU  Recent Labs     05/09/19  0553 05/10/19  0554 05/11/19  0535   INR 2.93* 4.10* 4.04*       Imaging:          CT HEAD WO CONTRAST   Final Result     No acute hemorrhage, hydrocephalus, or mass effect. Assessment & Plan: Miroslava Collazo is a 80 y.o. female with PMH of AF (on warfarin), HFrEF (30-35%) with Biventricular ICD and HTN who presented to [facility] after an unwitnessed fall and admitted for a C1 fracture. AMS (improving)  Likely 2/2 to ativan and dilaudid.  Possibly now 2/2 UTI.  - continue seroquel 12.5mg qD  - pt able to tolerate assisted PO intake; continue full liquid diet  - started megace for appetite stimulation    UTI  UA significant for LE with WBC 20-50, 4+ meghann  - continue cefdinar    Urinary retention  Pt required straight cath 2x last night. - continue bethanacol and flomax  - if no UOP by 6pm; insert patel (don't want UTI to persist 2/2 stasis)    Closed C1 fracture in setting of unwitnessed fall   CT C-spine showed nondisplaced dens fracture of C1. CT head did not show any acute findings. Neurosurgery consulted; no sx intervention now. BiV pacemaker not MRI compatible. - neurosx rec: [place]-J collar for 6-12 wks then dynamic flexion/extenasion spine film after 6 wks  - q4h neuro checks  - pain control with tylenol     KENDRA on CKD (stable)  Baseline creatinine around 1.6. Creatinine on presentation was 2.3. Received 250 cc bolus. - Cre 1.9 from 1.8  - hold IVF as may be cardiorenal  - will hold lasix 40mg in the AM to assess UOP; may need to use 1/2 dose in the future     HFrEF - not in exacerbation   ECHO in 2015 showed EF 30-35%. Patient has BiV ICD placed in 2013. At home on lasix 40 mg daily 5 times weekly. At home on coreg 25 mg BID. - continue home coreg 25mg  - hold lasix as above     AF on warfarin   May be 2/2 to amiodarone acting as cytochrome P450 inhibitor causing elevation in warfarin vs cardiohepatic.  - continue amiodarone PO  - INR therapeutic (4.04)  -- pharmacy to dose warfarin     Supratherapeutic INR  - pharmacy to dose warfarin  - INR up to 4.04    B/L LE edema with multiple bullous-like lesions and possible surrounding cellulitis   Patient appears to have severe LE edema with chronic venous stasis. Multiple bullous-like lesions with eruptions and surrounding erythema concerning for cellulitis.    - wound care  -- wound noted to have purulent drainage    CAD  - continue statin      HTN  - continue medications listed above      Hypothyroidism  TSH w/

## 2019-05-11 NOTE — PROGRESS NOTES
Type and Reason for Visit: Calorie Count    Current diet and supplement order:  Dietary Nutrition Supplements: Standard High Calorie Oral Supplement, Frozen Oral Supplement  DIET DYSPHAGIA I PUREED; Comparative Standards (Estimated Nutrition Needs):   · Estimated Daily Total Kcal: 0946-2074  · Estimated Daily Protein (g): 66-77    Date Consumed PO Intake Kcal %   Kcal met PO Intake grams protein %  Protein met   Comments   5/10 327 24% 10 15% 1/4 cup Vanilla pudding, 5% peanut butter fluff, 100% Magic Cup ONS                                                  Intervention & Recommendations:   1. Continue Calorie Count  2.   Please record % of each item consumed on tray ticket and place in pocket on door    Electronically signed by Micky Aparicio RD, LD on 5/11/19 at 11:07 AM    Contact Number: [phone]

## 2019-05-11 NOTE — PROGRESS NOTES
Clinical Pharmacy Progress Note    Admit date: 5/5/2019    Interval update: Patient with urinary retention requiring straight cath. No more mention of bloody urine. Subjective/Objective:  80 y.o. female with PMH significant for AF (on warfarin), HFrEF, ICD, HTN. Admitted with unwitnessed fall at home resulting in C1 fracture. Transferred to [facility] from [facility] for neurosurgery evaluation. Per neurosurgery, surgical intervention is not recommended for her cervical spine fractures. Patient is on chronic anticoagulation with warfarin for AF. Pharmacy is asked to dose warfarin per Dr. Nidhi Carson. Home anticoagulation regimen:   2 mg daily except 1 mg on Tues/Thurs  · Managed as an outpatient by Rocco Cadet  · Last INR check on 2/14 was 2.9  · Of note, patient has missed 3 appointments with Talat Rogers according to documentation in Epic and usually has a visit every 5 weeks. Labs:  Lab Results   Component Value Date    HGB 11.0 (L) 05/11/2019    HCT 33.9 (L) 05/11/2019     (L) 05/11/2019       Date INR Warfarin Dose Notes   5/5 8.74 --    5/6 10.45 -- Vitamin K 2 mg IV given   5/7 3.39 --    5/8 2.49 2mg    5/9 2.93 Instructed to HOLD  1mg given Per RN override \"not schedule to start until 5/14-mistake\"   5/10 4.10 HOLD    5/11 4.04 HOLD        Assessment/Plan:  1) Anticoagulation: Afib (goal INR = 2-3)   · Currently, warfarin is being assessed daily based on INR. · INR today = 4.04.   · No warfarin today; will hold given that the INR is essentially unchanged from yesterday's 4.1. · Will re-start warfarin when INR trends down and is closer to 3.     · INR was supratherapeutic upon admission for unknown reason. Patient is on amiodarone at home which can significantly increase INR, however per records she has been on this medication since at least 2011 so would not expect this to have an effect. LFTs are normal as well.  Patient has been historically very stable on outpatient dose per anticoagulation records. · Daily INR will be monitored and dose adjustments made as needed. Please call with any questions.   Vero OchoaD., BCPS   5/11/2019 2:24 PM  Wireless: [phone]

## 2019-05-11 NOTE — PROGRESS NOTES
VSS. Pt A/Ox1 to self. Tolerated pills crushed in applesauce. Last straight cath at [address]. Pt has not voided since and has 315 ml at most recent bladder scan. Paging Medial resident for order to straight cath again. Pt arms dressed and wrapped per wound care. Pt expects redressing of wounds in AM. Pt remains on specialty mattress. NV intact. Up x2 oscar steady. Awaits SNF placement. cerviacal collar on and aligned. Bed alarm set. Call light in reach. Will continue to monitor. AVAsys for safety. Pt straight cath'd at 501 for 400 ml. Urine is clear yellow without odor.

## 2019-05-12 NOTE — PROGRESS NOTES
Normal rate. Exam reveals no gallop and no friction rub. No murmur heard. Pulmonary/Chest: Effort normal and breath sounds normal.   Abdominal: Soft. Bowel sounds are normal.   Musculoskeletal: Normal range of motion. Neurological: No cranial nerve deficit. Alert and oriented to self only. Skin: Skin is warm and dry. LABS:    CBC:   Recent Labs     05/10/19  0554 05/11/19  0535 05/12/19  0501   WBC 7.2 7.2 6.4   HGB 11.0* 11.0* 12.0   HCT 33.9* 33.9* 37.7   * 121* 142   MCV 98.0 98.0 98.4     Renal:    Recent Labs     05/10/19  0554 05/11/19  0535 05/12/19  0501    148* 151*   K 3.7 3.9 3.5    108 110   CO2 26 20* 28   BUN 38* 31* 28*   CREATININE 1.8* 1.9* 2.0*   GLUCOSE 70 88 106*   CALCIUM 8.4 8.6 8.7   PHOS 3.2 3.1 3.0   ANIONGAP 12 20* 13     Hepatic:   Recent Labs     05/10/19  0554 05/11/19  0535 05/12/19  0501   LABALBU 3.0* 2.9* 3.2*     Troponin: No results for input(s): TROPONINI in the last 72 hours. BNP: No results for input(s): BNP in the last 72 hours. Lipids: No results for input(s): CHOL, HDL in the last 72 hours. Invalid input(s): LDLCALCU, TRIGLYCERIDE  ABGs:  No results for input(s): PHART, FIA0ZTR, PO2ART, UBN8YBD, BEART, THGBART, C2USNNIW, WEU7KYK in the last 72 hours. INR:   Recent Labs     05/10/19  0554 05/11/19  0535 05/12/19  0501   INR 4.10* 4.04* 4.15*     Lactate: No results for input(s): LACTATE in the last 72 hours. Cultures:  -----------------------------------------------------------------  RAD:   CT HEAD WO CONTRAST   Final Result     No acute hemorrhage, hydrocephalus, or mass effect. Assessment/Plan: Anabel Kamara is a 80 y.o. female with PMH of AF (on warfarin), HFrEF (30-35%) with Biventricular ICD and HTN who presented to [facility] after an unwitnessed fall and admitted for a C1 fracture.       Closed C1 fracture 2/2 fall-CT C-spine showed nondisplaced dens fracture of C1. CT head negative.   Neurosurgery consulted; no sx intervention now. BiV pacemaker not MRI compatible. - neurosx rec: [place]-J collar for 6-12 wks then dynamic flexion/extenasion spine film after 6 wks  - q4h neuro checks  - pain control with tylenol     AMS - Likely 2/2 to polypharmacy vs UTI. Patient still pleasantly confused but able to follow some commands. - continue seroquel 12.5mg qD  - pt able to tolerate assisted PO intake; continue full liquid diet     UTI  UA significant for LE with WBC 20-50, 4+ bacteria   - continue cefdinar, day 4     Urinary retention- Bradshaw catheter replaced overnight as patient was unable to void. - continue bethanacol and flomax     KENDRA on CKD (stable)- Baseline creatinine around 1.6. Creatinine on presentation was 2.3, now 2.0. IVFs and lasix held initially; however, appears dry on exam with hypernatremia and will likely need gentle fluid hydration. Unfortunately, patient pulled PIV and RNs unable to obtain access.    - Will attempt to get PIV  - Gentle IVFs    Hypernatremia- Na 148 --> 151 this AM. Appears dry on exam. Will attempt to obtain PIV for IVFs, if unsuccessful, will encourage increased PO intake (fluids)   - Place PIV  - Gentle IVFs  - Repeat RFP     HFrEF - not in exacerbation   - continue home coreg 25mg  - holding home lasix      AF on warfarin   - continue amiodarone   - pharmacy to dose warfarin, see below      Supratherapeutic INR  - pharmacy to dose warfarin, holding dose currently   - INR up to 4.15 this morning      B/L LE edema with multiple bullous-like lesions and possible surrounding cellulitis   - wound care     CAD  - continue statin      HTN  - continue medications listed above      Hypothyroidism  - Synthroid 25mcg daily      Elevated Troponin (resolved)  - Cont to monitor        Macrocytic anemia  RBC 11.6 with   - folate (>20) and vit B12 (>2000)      Code Status: limited   FEN: dysphagia 1 diet   PPX: On warfarin   DISPO: Elsy Gayle MD  Internal Medicine,

## 2019-05-12 NOTE — PROGRESS NOTES
Pt continues to have incontinent BM's. These are soft but not watery. Dr Jose Duggan was made aware during rounds that the patient is having redish color urine at this time.

## 2019-05-12 NOTE — DISCHARGE SUMMARY
Head: Normocephalic. Eyes: Pupils are equal, round, and reactive to light. EOM are normal.   Neck:   Neck brace in place.    Cardiovascular: Normal rate. Exam reveals no gallop and no friction rub.   No murmur heard. Pulmonary/Chest: Effort normal and breath sounds normal.   Abdominal: Soft. Bowel sounds are normal.   Musculoskeletal: Normal range of motion. Neurological: No cranial nerve deficit.   Alert and oriented to self only.    Skin: Skin is warm and dry.            Consults: Pharmacy, palliative care, neurosurgery   Significant Diagnostic Studies:  CT  Treatments: Neck brace, antibiotics   Disposition: long term care facility  Discharged Condition: Stable  Follow Up: Primary Care Physician in two weeks    DISCHARGE MEDICATION:     Medication List      START taking these medications    bethanechol 5 MG tablet  Commonly known as:  URECHOLINE  Take 1 tablet by mouth 3 times daily     megestrol 40 MG/ML suspension  Commonly known as:  MEGACE  Take 10 mLs by mouth daily     tamsulosin 0.4 MG capsule  Commonly known as:  FLOMAX  Take 1 capsule by mouth daily        CHANGE how you take these medications    warfarin 2 MG tablet  Commonly known as:  COUMADIN  Take as directed. If you are unsure how to take this medication, talk to your nurse or doctor.   Original instructions:  TAKE ONE TO TWO TABLETS BY MOUTH DAILY  What changed:    · how much to take  · how to take this  · when to take this  · additional instructions        CONTINUE taking these medications    acetaminophen 325 MG tablet  Commonly known as:  AMINOFEN  Take 2 tablets by mouth every 6 hours as needed for Pain     amiodarone 200 MG tablet  Commonly known as:  CORDARONE  TAKE ONE TABLET BY MOUTH FIVE TIMES WEEKLY     atorvastatin 10 MG tablet  Commonly known as:  LIPITOR  TAKE ONE TABLET BY MOUTH DAILY     carvedilol 25 MG tablet  Commonly known as:  COREG  TAKE ONE TABLET BY MOUTH TWICE A DAY WITH MEALS     furosemide 40 MG tablet  Commonly known as:  LASIX  TAKE ONE TABLET BY MOUTH FIVE TIMES WEEKLY     levothyroxine 25 MCG tablet  Commonly known as:  SYNTHROID  TAKE ONE TABLET BY MOUTH DAILY     lisinopril 5 MG tablet  Commonly known as:  PRINIVIL;ZESTRIL  TAKE ONE TABLET BY MOUTH DAILY     Polysaccharide Iron Complex 391.3 (180 Fe) MG Caps  Commonly known as:  PROFE  Take 180 mg by mouth daily. Where to Get Your Medications      You can get these medications from any pharmacy    Bring a paper prescription for each of these medications  · bethanechol 5 MG tablet  · megestrol 40 MG/ML suspension  · tamsulosin 0.4 MG capsule       Activity: activity as tolerated  Diet: regular diet and encourage fluids  Wound Care: keep wound clean and dry    Time Spent on discharge is more than 45 minutes    Signed:  Darryl Ward MD   PGY1  5/12/2019             Patient seen and evaluated with the medical resident. I was physically present during the critical portions of the service when performed by the resident including the assessment and management of the patient. I agree with the findings and plans as described. My time spent reviewing discharge plan and follow ups with resident, along with performing independent review of discharge medicines along with counseling the patient exceeds 35 minutes.     Adri Rios MD  5:35 AM 5/15/2019

## 2019-05-12 NOTE — PROGRESS NOTES
Clinical Pharmacy Progress Note    Admit date: 5/5/2019    Interval update: Patient with urinary retention requiring straight cath; now with hematuria from traumatic insertion. Also incontinent soft BMs, redish in color. Subjective/Objective:  80 y.o. female with PMH significant for AF (on warfarin), HFrEF, ICD, HTN. Admitted with unwitnessed fall at home resulting in C1 fracture. Transferred to [facility] from [facility] for neurosurgery evaluation. Per neurosurgery, surgical intervention is not recommended for her cervical spine fractures. Patient is on chronic anticoagulation with warfarin for AF. Pharmacy is asked to dose warfarin per Dr. Yue Wu. Home anticoagulation regimen:   2 mg daily except 1 mg on Tues/Thurs  · Managed as an outpatient by Rocco Cadet  · Last INR check on 2/14 was 2.9  · Of note, patient has missed 3 appointments with [facility] according to documentation in Epic and usually has a visit every 5 weeks. Labs:  Lab Results   Component Value Date    HGB 12.0 05/12/2019    HCT 37.7 05/12/2019     05/12/2019       Date INR Warfarin Dose Notes   5/5 8.74 --    5/6 10.45 -- Vitamin K 2 mg IV given   5/7 3.39 --    5/8 2.49 2mg    5/9 2.93 No dose ordered for this date  1mg given Per RN override \"not schedule to start until 5/14-mistake\"   5/10 4.10 HOLD    5/11 4.04 HOLD    5/12 4.15 HOLD              Assessment/Plan:  1) Anticoagulation: Afib (goal INR = 2-3)   · Currently, warfarin is assessed daily based on INR. · INR today = 4.15, similar to the prior two days. Unclear why INR is not trending down, as doses have been held for three days. · No warfarin today. Patient with redish BMs, as well as hematuria. Hgb stable. · Will resume warfarin when INR trends down and is closer to 3.     · INR was supratherapeutic upon admission for unknown reason.  Patient is on amiodarone at home which can significantly increase INR, however per records

## 2019-05-12 NOTE — PROGRESS NOTES
Patient resting in bed, has not voided yet, bladder scanned for 359 ml, [place] J collar in place, incontinent of bowel, vitals stable, bed alarm on, call light in reach, will continue to monitor

## 2019-05-13 NOTE — CARE COORDINATION
Spoke with daughter Juanita Blake to let her know that none of the facilities are able to accept that we discussed but will print off a list of facilities that are in network. Per website kept getting no results. Called to see if she has skilled nursing facility benefits and was able to go into a different part of the website and found a list of facilities. Juanita Blake will be in today around 11 am so we can discuss the options.      Electronically signed by Yuliana Krishna RN on 5/13/2019 at 9:39 AM  [phone]

## 2019-05-13 NOTE — PROGRESS NOTES
fat loss, Orbital  4. Muscle Loss-Severe muscle mass loss, Scapula (trapezius), Clavicles (pectoralis and deltoids)  5. Fluid Accumulation-No significant fluid accumulation,    6.  Strength-Not measured    Nutrition Risk Level: High    Nutrition Needs:  · Estimated Daily Total Kcal: 7067-2461(76-17)  · Estimated Daily Protein (g): 66-77(1.2-1.4)  · Estimated Daily Fluid (ml/day): 1650 ml    Nutrition Diagnosis:   · Problem: Severe malnutrition  · Etiology: related to Insufficient energy/nutrient consumption     Signs and symptoms:  as evidenced by Intake 0-25%, Severe loss of subcutaneous fat, Severe muscle loss    Objective Information:  · Nutrition-Focused Physical Findings: More alert but confused, LBM 5/12, +1 pitting edema BLE  · Wound Type: Pressure Ulcer, Stage I  · Current Nutrition Therapies:  · Oral Diet Orders: Dysphagia 1 (Pureed)   · Oral Diet intake: 1-25%  · Oral Nutrition Supplement (ONS) Orders: Standard High Calorie Oral Supplement, Frozen Oral Supplement  · ONS intake: 1-25%  · Tube Feeding (TF) Orders:   · Goal TF & Flush Orders Provides: With placement of corpak, start Jevity 1.2 @ goal 55 ml/hr to provide 1320 ml total volume, 1584 kcal, 73 g protein and 1065 ml free water + 100 ml water bolus every 4 hours.    · Anthropometric Measures:  · Ht: 5' 3\" (160 cm)   · Current Body Wt: 164 lb (74.4 kg)(Unsure of accuracy, drastically greater than admit wt of 121#)  · Admission Body Wt: 121 lb (54.9 kg)  · Weight Change: New wt requested 5/13   · Ideal Body Wt: 115 lb (52.2 kg),   · BMI Classification: BMI 18.5 - 24.9 Normal Weight    Nutrition Interventions:   (d/c Calorie count)  Continued Inpatient Monitoring    Nutrition Evaluation:   · Evaluation: No progress toward goals   · Goals: pt will tolerate EN to provide 100% of nutrition needs until PO intake can improve to meet 50% or more of patient nutrition needs   · Monitoring: Meal Intake, Supplement Intake, Diet Tolerance, Pertinent Labs, Weight      Electronically signed by Erica Jung RD, LD on 5/13/19 at 1:59 PM    Contact Number: [phone]

## 2019-05-13 NOTE — PROGRESS NOTES
Clinical Pharmacy Progress Note    Admit date: 5/5/2019    Subjective/Objective:  80 y.o. female with PMH significant for AF (on warfarin), HFrEF, ICD, HTN. Admitted with unwitnessed fall at home resulting in C1 fracture. Transferred to [facility] from [facility] for neurosurgery evaluation. Per neurosurgery, surgical intervention is not recommended for her cervical spine fractures. Patient is on chronic anticoagulation with warfarin for AF. Pharmacy is asked to dose warfarin per Dr. Keyla Canela. Home anticoagulation regimen:   2 mg daily except 1 mg on Tues/Thurs  · Managed as an outpatient by Rocco Cadet  · Last INR check on 2/14 was 2.9  · Of note, patient has missed 3 appointments with [facility] Rogers according to documentation in Epic and usually has a visit every 5 weeks. Labs:  Lab Results   Component Value Date    HGB 11.6 (L) 05/13/2019    HCT 36.6 05/13/2019     (L) 05/13/2019       Date INR Warfarin Dose Notes   5/5 8.74 --    5/6 10.45 -- Vitamin K 2 mg IV given   5/7 3.39 --    5/8 2.49 2mg    5/9 2.93 No dose ordered for this date  1mg given Per RN override \"not schedule to start until 5/14-mistake\"   5/10 4.10 HOLD    5/11 4.04 HOLD    5/12 4.15 HOLD    5/13 3.77 HOLD        Assessment/Plan:  1) Anticoagulation: Afib (goal INR = 2-3)   · INR today remains supratherapeutic at 3.77, but is now beginning to downtrend from 4.15 yesterday. Persistently elevated INR may be due to poor PO intake and cefdinir (started 5/9). · Will continue to hold warfarin today as INR still above goal range. Hgb is stable. Will plan to resume warfarin when INR further trends down and is closer to 3.     · INR was supratherapeutic upon admission for unknown reason. Patient is on amiodarone at home which can significantly increase INR, however per records she has been on this medication since at least 2011 so would not expect this to have an effect. LFTs are normal as well.  Patient has been historically very stable on outpatient dose per anticoagulation records. · Daily INR will be monitored and dose adjustments made as needed. Please call with any questions.   Kristel Herring, PharmD, BCPS  5/13/2019 1:34 PM  Wireless: [id number]

## 2019-05-13 NOTE — PROGRESS NOTES
mastication with ice chip and cracker. Unable to assess for lingual or buccal residue as pt would not open mouth on command for inspection. Pt able to adequately close lips around edge of cup. No anterior spillage with any consistency. Pharyngeal- pt noted to produce a cough prior to po trials. Pt with one delayed cough (pt consumed 6 oz total) with trials of water by cup. Pt able to initiate swallow without difficulty with laryngeal movement observed. Unable to assess vocal quality as verbalization was very limited. No throat clearing or choking observed with any consistency. Pt consumed 3oz water uninterrupted by cup without difficulty. MBS results: N/A    Pain: Pt denied pain     Current Diet : Pureed with thin liquids- 1:1 assistance with meals     Treatment:  Pt seen bedside to address the following goals:  1- The patient will tolerate recommended diet without observed clinical signs of aspiration   5/11: Pt seen in room, upright in bed. Pt appears to be more alert and oriented than recent attempts by ST. Pt was pleasant and cooperative and followed 2 step directions. Thin liquid presented via cup and straw. No overt s/s of penetration/aspiration observed. Ice chips trialed with observed bolus holding without intent to masticate, no overt s/s of penetration/aspiration. RN administered pt's meds crushed in puree. Pt demonstrated timely AP transit with puree during med administration, as well as during PO trials of Magic Cup and JellO. Cervical collar decreased ROM of mandible for PO intake and mastication. Regular cracker was trialed and sufficient mastication observed. Pt's level of alertness decreased over time. D/t fluctuating level of alertness, recommend puree/thin diet. Continue goal  5/13-  The PCA reported pt fed herself breakfast and that pt exhibited no s/s aspiration. Pt alert, responding more appropriately to questions.  In attempt to analyze ability to upgrade diet from pureed, attempted pt

## 2019-05-13 NOTE — PROGRESS NOTES
Progress Note    Admit Date: 5/5/2019  Diet: Dietary Nutrition Supplements: Standard High Calorie Oral Supplement, Frozen Oral Supplement  DIET DYSPHAGIA I PUREED;    CC: C1 fracture     Interval history: Rubio Epps a 80 y. o. female with PMH of AF (on warfarin), HFrEF (30-35%) with Biventricular ICD and HTN who presented to [facility] after an unwitnessed fall and admitted for a C1 fracture.     Subjective: Patient was seen and examined at bedside. No acute events overnight. Patient alert and oriented to self. Not able obtain full ROS, however pt laying comfortably in bed. Medications:     Scheduled Meds:   bethanechol  5 mg Oral TID    tamsulosin  0.4 mg Oral Daily    megestrol  400 mg Oral Daily    warfarin (COUMADIN) daily dosing (placeholder)   Other See Admin Instructions    QUEtiapine  12.5 mg Oral Nightly    cefdinir  300 mg Oral Daily    HYDROCERIN   Topical BID    amiodarone  200 mg Oral Once per day on Mon Tue Wed Thu Fri    melatonin  2 mg Oral Once    carvedilol  25 mg Oral BID WC    [Held by provider] furosemide  40 mg Oral Daily    levothyroxine  25 mcg Oral QAM AC    sodium chloride flush  10 mL Intravenous 2 times per day     Continuous Infusions:   sodium chloride 100 mL/hr at 05/13/19 0413     PRN Meds:sodium chloride flush, magnesium hydroxide, ondansetron, perflutren lipid microspheres    Objective:   Vitals:   T-max:  Patient Vitals for the past 8 hrs:   BP Temp Temp src Pulse Resp SpO2 Weight   05/13/19 0945 137/79 97.9 °F (36.6 °C) Oral 81 18 91 % --   05/13/19 0607 -- -- -- -- -- -- 164 lb 12.8 oz (74.8 kg)   05/13/19 0340 114/72 98.7 °F (37.1 °C) Oral 75 18 95 % --       Intake/Output Summary (Last 24 hours) at 5/13/2019 1115  Last data filed at 5/13/2019 0532  Gross per 24 hour   Intake 920 ml   Output 700 ml   Net 220 ml       Physical Exam  Constitutional:   Frail appearing elderly female    HENT:   Head: Normocephalic.    Eyes: Pupils are equal, round, and reactive to light. EOM are normal.   Neck:   Neck brace in place. Cardiovascular: Normal rate. Exam reveals no gallop and no friction rub. No murmur heard. Pulmonary/Chest: Effort normal and breath sounds normal.   Abdominal: Soft. Bowel sounds are normal.   Musculoskeletal: Normal range of motion. Neurological: No cranial nerve deficit. Alert and oriented to self only. Skin: Skin is warm and dry.      LABS:    CBC:   Recent Labs     05/11/19  0535 05/12/19 0501 05/13/19 0513   WBC 7.2 6.4 8.5   HGB 11.0* 12.0 11.6*   HCT 33.9* 37.7 36.6   * 142 131*   MCV 98.0 98.4 98.3     Renal:    Recent Labs     05/12/19 0501 05/12/19 1805 05/13/19 0513   * 144 145   K 3.5 3.8 3.9    107 109   CO2 28 23 23   BUN 28* 31* 27*   CREATININE 2.0* 1.6* 1.4*   GLUCOSE 106* 143* 101*   CALCIUM 8.7 8.4 8.5   PHOS 3.0 2.7 2.7   ANIONGAP 13 14 13     Hepatic:   Recent Labs     05/12/19 0501 05/12/19 1805 05/13/19 0513   LABALBU 3.2* 3.4 3.2*     Troponin: No results for input(s): TROPONINI in the last 72 hours. BNP: No results for input(s): BNP in the last 72 hours. Lipids: No results for input(s): CHOL, HDL in the last 72 hours. Invalid input(s): LDLCALCU, TRIGLYCERIDE  ABGs:  No results for input(s): PHART, ZHZ0NIE, PO2ART, YSP3VKV, BEART, THGBART, B0LVNQEM, OSR3GDQ in the last 72 hours. INR:   Recent Labs     05/11/19 0535 05/12/19 0501 05/13/19 0513   INR 4.04* 4.15* 3.77*     Lactate: No results for input(s): LACTATE in the last 72 hours. Cultures:  -----------------------------------------------------------------  RAD:   CT HEAD WO CONTRAST   Final Result     No acute hemorrhage, hydrocephalus, or mass effect. Assessment/Plan: Lynda Romo a 80 y. o. female with PMH of AF (on warfarin), HFrEF (30-35%) with Biventricular ICD and HTN who presented to [facility] after an unwitnessed fall and admitted for a C1 fracture.     Closed C1 fracture 2/2 fall-CT C-spine showed nondisplaced dens fracture of C1. CT head negative. Neurosurgery consulted; no sx intervention now. BiV pacemaker not MRI compatible. - neurosx rec: [place]-J collar for 6-12 wks then dynamic flexion/extenasion spine film after 6 wks  - q4h neuro checks  - pain control with tylenol  - consult palliative care     AMS - Likely 2/2 to polypharmacy vs UTI. Patient still pleasantly confused but able to follow some commands.   - continue seroquel 12.5mg qD  - pt able to tolerate assisted PO intake; continue full liquid diet     UTI  UA significant for LE with WBC 20-50, 4+ bacteria   - s/p 5 days cefdinir      Urinary retention- Bradshaw catheter replaced overnight as patient was unable to void. - continue bethanacol and flomax     KENDRA on CKD (stable/resolving)- Baseline creatinine around 1.6.  Creatinine on presentation was 2.3, now 1.4.  - BMP  - cont to monitor    Hypernatremia: Resolved Na 145  this AM.  - bmp     HFrEF - not in exacerbation   - continue home coreg 25mg  - holding home lasix      AF on warfarin   - continue amiodarone   - pharmacy to dose warfarin, see below      Supratherapeutic INR  - pharmacy to dose warfarin, holding dose currently   - INR up to 3.77 this morning      B/L LE edema with multiple bullous-like lesions and possible surrounding cellulitis   - wound care     CAD  - continue statin      HTN  - continue medications listed above      Hypothyroidism  - Synthroid 25mcg daily      Elevated Troponin (resolved)  - Cont to monitor         Macrocytic anemia  RBC 11.6 with   - folate (>20) and vit B12 (>2000)        Code Status: limited   FEN: Dietary Nutrition Supplements: Standard High Calorie Oral Supplement, Frozen Oral Supplement  DIET DYSPHAGIA I PUREED;  PPX: holding warfarin     DISPO: Bonnie Yen, PGY-1  05/13/19  11:15 AM    This patient has been staffed and discussed with Mirella Tracy MD.

## 2019-05-13 NOTE — PROGRESS NOTES
Type and Reason for Visit: Calorie Count    Current diet and supplement order:  Dietary Nutrition Supplements: Standard High Calorie Oral Supplement, Frozen Oral Supplement  DIET DYSPHAGIA I PUREED;     Comparative Standards (Estimated Nutrition Needs):   · Estimated Daily Total Kcal: 2579-9541(30-92)  · Estimated Daily Protein (g): 66-77(1.2-1.4)    Date Consumed PO Intake Kcal %   Kcal met PO Intake grams protein %  Protein met   Comments   5/10 327 24% 10 15% 1/4 cup Vanilla pudding, 5% peanut butter fluff, 100% Magic Cup ONS   5/11 771 kcal 46% 40 gm 51% Pudding, soup, magic cup, pork roast   5/12 390 kcal 23% 19 gm 24% Breakfast only, magic cup and yogurt                                  Intervention & Recommendations:     **D/C Calorie Count, patient not adequately meeting nutrition needs, see full progress note for recommendations    Jigar Doss RD, RAMON  Pager:  [phone]  Office:  [phone]

## 2019-05-13 NOTE — CARE COORDINATION
Spoke with daughter Shamika Drummond at bedside and gave her the list of in network facilities along with my contact information. Will follow up with her later today to see if she has made a few choices.      Electronically signed by Candy Rios RN on 5/13/2019 at 2:37 PM  [phone]

## 2019-05-13 NOTE — CARE COORDINATION
Received a call back and [facility] is able to accept pt. They have a semi-private room available and will begin precert tomorrow.      Electronically signed by Khoi Morales RN on 5/13/2019 at 6:32 PM  [phone]

## 2019-05-13 NOTE — FLOWSHEET NOTE
05/13/19 1613   Encounter Summary   Services provided to: Patient   Volunteer Visit   (5/12 celina)   Sacraments   Communion Patient received communion

## 2019-05-14 NOTE — PLAN OF CARE
Increase functional independence level to baseline status.
Increase functional status to baseline
Intervention: Speech Evaluation/treatment  SLP completed evaluation. Please refer to notes in EMR.
Nutrition Problem: Inadequate oral intake  Intervention: Food and/or Nutrient Delivery: Continue NPO, Start oral diet, Start ONS(when medically appropriate)  Nutritional Goals: Pt will advance to PO diet with intakes of 50% or more this admission
Nutrition Problem: Severe malnutrition  Intervention: Food and/or Nutrient Delivery: (d/c Calorie count)  Nutritional Goals: pt will tolerate EN to provide 100% of nutrition needs until PO intake can improve to meet 50% or more of patient nutrition needs
Patient continues to deny any pain at this time.
Problem: Falls - Risk of:  Goal: Will remain free from falls  Description  5/14/2019 0058 by Rodney Rasmussen RN  Outcome: Ongoing  Patient has remained free of falls. 2/4 bed rails up, bed locked and in lowest position, call light within reach. Patient instructed on use of call light but does not use appropriately. Bed alarm on. Non-skid footwear and fall band on. Will continue to monitor. Problem: Risk for Impaired Skin Integrity  Goal: Tissue integrity - skin and mucous membranes  Description  5/14/2019 0058 by Rodney Rasmussen RN  Outcome: Ongoing  Patient turned and repositioned every two hours. Barrier cream used on bottom. Sacral heart on buttocks. Heels elevated off of bed. Specialty mattress in use. Skin thoroughly assessed. Will continue to monitor.
Problem: Falls - Risk of:  Goal: Will remain free from falls  Description  Fall precautions in place. Bed is in lowest position, wheels locked, alarm on, non-skid socks on. Call light and bedside table within reach. Patient calls out appropriately. Patient is resting comfortably in bed. [place] J collar to be worn when OOB. Will continue to assess and monitor. 5/6/2019 2311 by Alexey Washington RN  Outcome: Ongoing    Problem: Risk for Impaired Skin Integrity  Goal: Tissue integrity - skin and mucous membranes  Description  Pt on special mattress, being turned frequently. No open areas to buttocks, slight redness. Pt bathed this shift, patel care performed. Bilateral legs in dressings, elevated on pillows.   5/6/2019 2311 by Alexey Washington RN  Outcome: Ongoing
Problem: Falls - Risk of:  Goal: Will remain free from falls  Description  Fall precautions in place. Bed is in lowest position, wheels locked, alarm on, non-skid socks on. Call light and bedside table within reach. Patient does not call out appropriately. Patient is up x max assist. [employer]sys in room for safety, pt close to nursing station. Will continue to assess and monitor. 5/8/2019 2238 by Alexey Washington RN  Outcome: Ongoing       Problem: Risk for Impaired Skin Integrity  Goal: Tissue integrity - skin and mucous membranes  Description  Patient on specialty mattress, assisted with turning and repositioning every 2 hours. Kassie care and moisture barrier cream applied as needed. Bradshaw care performed.    5/8/2019 2238 by Alexey Washington RN  Outcome: Ongoing
Problem: Falls - Risk of:  Goal: Will remain free from falls  Description  Patient confused and impulsive. Patient in bed with alarm and nonskid socks on. Bed in lowest position with 3/4 side rails up. [employer] monitoring system on for safety. Will monitor. 5/13/2019 0015 by Fela Neves RN  Note:   Pt free from injury or falls at this time, fall precautions in place, bed in low position, side rail up x2, French Fall Risk: High (45 and higher), bed alarm on, reoriented to room and call light, reminded not to get up without assistance, call light in reach, will continue to monitor.       Problem: Confusion - Acute:  Goal: Absence of continued neurological deterioration signs and symptoms  Description  Absence of continued neurological deterioration signs and symptoms  5/13/2019 0015 by Fela Neves RN  Note:   Patient remains confused, answers some questions, continues to try and pull at Bradshaw and tele wires, wires hidden or moved to prevent pulling, Avasys on        Problem: Pain:  Goal: Pain level will decrease  Description  Pain level will decrease  Note:   No signs of pain at this time
Problem: Falls - Risk of:  Goal: Will remain free from falls  Description  Patient confused and impulsive. Patient in bed with alarm and nonskid socks on. Bed in lowest position with 3/4 side rails up. [employer] monitoring system on for safety. Will monitor. Note:   Patient high fall risk and is up with maximum assist. Patient alert and oriented to self only, non skid socks on, bed in lowest position and locked, side rails up x2, call light and belongings within reach, bed alarm on for safety, fall sign posted, avasys monitor on, patient moved closer to nurses station. Will continue to monitor. Problem: Neurological  Goal: Maximum potential motor/sensory/cognitive function  Outcome: Ongoing  Note:   Patient oriented to self only, patient has sensation to all extremities, pulses are +1 to upper extremities and doppler is used for BLE due to swelling. Patient has weak hand grasps and dorsiflexion and plantar flexion. Cervical brace in place. Will continue to monitor.
Problem: Falls - Risk of:  Goal: Will remain free from falls  Description  Patient confused and impulsive. Patient in bed with alarm and nonskid socks on. Bed in lowest position with 3/4 side rails up. [employer] monitoring system on for safety. Will monitor. Outcome: Ongoing     Problem: Falls - Risk of:  Goal: Will remain free from falls  Description  Patient confused and impulsive. Patient in bed with alarm and nonskid socks on. Bed in lowest position with 3/4 side rails up. [employer] monitoring system on for safety. Will monitor. Outcome: Ongoing  Goal: Absence of physical injury  Description  Absence of physical injury  Outcome: Ongoing     Problem: Risk for Impaired Skin Integrity  Goal: Tissue integrity - skin and mucous membranes  Description  Patient on specialty mattress, assisted with turning and repositioning every 2 hours. Kassie care and moisture barrier cream applied as needed. No new skin breakdown noted this shift. Outcome: Ongoing     Problem: Confusion - Acute:  Goal: Absence of continued neurological deterioration signs and symptoms  Description  Absence of continued neurological deterioration signs and symptoms  Outcome: Ongoing  Goal: Mental status will be restored to baseline  Description  Mental status will be restored to baseline  Outcome: Ongoing     Problem: Injury - Risk of, Physical Injury:  Goal: Will remain free from falls  Description  Patient confused and impulsive. Patient in bed with alarm and nonskid socks on. Bed in lowest position with 3/4 side rails up. [employer] monitoring system on for safety. Will monitor.     Outcome: Ongoing  Goal: Absence of physical injury  Description  Absence of physical injury  Outcome: Ongoing     Problem: Nutrition  Goal: Optimal nutrition therapy  5/13/2019 1416 by Maricel Yancey RD, LD  Outcome: Ongoing
Problem: Falls - Risk of:  Goal: Will remain free from falls  Description  Patient confused and impulsive. Patient in bed with alarm and nonskid socks on. Bed in lowest position with 3/4 side rails up. [employer] monitoring system on for safety. Will monitor. 5/14/2019 1420 by Dipesh Wright RN  Outcome: Ongoing  Note:   Patient high fall risk and is up with maximum assist x2. Patient alert and oriented to self only, non skid socks on, bed in lowest position and locked, side rails up x2, call light and belongings within reach, bed alarm on for safety, fall sign posted. Will continue to monitor.
Problem: Falls - Risk of:  Goal: Will remain free from falls  Description  Patient confused and impulsive. Patient in bed with alarm and nonskid socks on. Bed in lowest position with 3/4 side rails up. [employer] monitoring system on for safety. Will monitor. Outcome: Ongoing     Problem: Risk for Impaired Skin Integrity  Goal: Tissue integrity - skin and mucous membranes  Description  Patient on specialty mattress, assisted with turning and repositioning every 2 hours. Kassie care and moisture barrier cream applied as needed. No new skin breakdown noted this shift.      Outcome: Ongoing
Problem: Falls - Risk of:  Goal: Will remain free from falls  Description  Will remain free from falls  5/6/2019 0142 by Pancho Gonzalez RN  Outcome: Ongoing   D)  Patient identified as fall risk. A)  Fall precautions in place and bed alarm in use. Instructed to call prior to getting OOB for assistance. R)  Verbalized understanding. Remains free from injury and fall. Problem: Risk for Impaired Skin Integrity  Goal: Tissue integrity - skin and mucous membranes  Description  Structural intactness and normal physiological function of skin and  mucous membranes. 5/6/2019 0142 by Pancho Gonzalez RN  Outcome: Ongoing   Peter scale score 14. Multiple skin issues. Bilateral feet with several open oozing blisters. LE with 2+ edema. Bilateral heels with redness. Left foot and lower leg with bruising. Scattered bruising and abrasions noted. Dressings to bilateral feet changed. Silicone dressing with kerlix and abd. Heels elevated with pillows. Remains on low airloss mattress. Turning and repositioning Q2 hours. Wound care consult placed. Will monitor. Problem: Pain Control  Goal: Maintain pain level at or below patient's acceptable level (or 5 if patient is unable to determine acceptable level)  Outcome: Ongoing   Patient has been resting comfortable. Moans and grimaces with repositioning, appears comfortable at rest.  C-Collar in place. Repositoning for comfort. Will monitor. Problem: Neurological  Goal: Maximum potential motor/sensory/cognitive function  Outcome: Ongoing   Patient has resting with eyes close. Responds to voice. Oriented to person. Follows some simple commands with neuro exam.  MIGUEL ÁNGEL any sensory problems at this time. Reoriented to place, time and situation. Will monitor.
Problem: Falls - Risk of:  Goal: Will remain free from falls  Description  Will remain free from falls  Outcome: Ongoing     Problem: Risk for Impaired Skin Integrity  Goal: Tissue integrity - skin and mucous membranes  Description  Structural intactness and normal physiological function of skin and  mucous membranes.   Outcome: Ongoing
Problem: Falls - Risk of:  Goal: Will remain free from falls  Description  Will remain free from falls. Patient is confused. Patient door left open and bed alarm in place. Patient checked on frequently during shift. Outcome: Ongoing     Problem: Risk for Impaired Skin Integrity  Goal: Tissue integrity - skin and mucous membranes  Description  Structural intactness and normal physiological function of skin and  mucous membranes. Patient has speciality mattress and is turned every 2 hours to prevent skin breakdown.    Outcome: Ongoing
Problem: Nutrition  Goal: Optimal nutrition therapy  Note:   Nutrition Problem: Inadequate oral intake  Intervention: Food and/or Nutrient Delivery: Start Calorie Count, Start Tube Feeding  Nutritional Goals: pt will tolerate EN to provide 100% of nutrition needs until PO intake can improve to meet 50% or more of patient nutrition needs
Pt has been changed and checked for incontinent episodes every 2 hours with skin care preformed as needed.
Risk: High (45 and higher), bed alarm on, [employer] monitoring on, reoriented to room and call light, patient alert to self only and unable to follow commands at times, reminded not to get up without assistance, call light in reach, will continue to monitor.      Problem: Pain Control  Goal: Maintain pain level at or below patient's acceptable level (or 5 if patient is unable to determine acceptable level)  Outcome: Ongoing  Note:   Patient pain score 0 on advanced dementia scale     Problem: Neurological  Goal: Maximum potential motor/sensory/cognitive function  Outcome: Ongoing  Note:   C-collar on and aligned, patient able to move all extremities

## 2019-05-14 NOTE — DISCHARGE INSTR - COC
Continuity of Care Form    Patient Name: Neale Merlin   :  3/3/1929  MRN:  [id number]    Admit date:  2019  Discharge date:  2019    Code Status Order: Limited   Advance Directives:   [facility] Documentation     Date/Time Healthcare Directive Type of Healthcare Directive Copy in [address] Agent's Name Healthcare Agent's Phone Number    [phone]  Yes, patient has an advance directive for healthcare treatment  Durable power of  for health care;Living will  No, copy requested from family  Healthcare power of   Erasmo Barrier  [phone]          Admitting Physician:  Wang Johnson MD  PCP: Dickson Messina MD    Discharging Nurse: [facility] Unit/Room#: 6852/4298-47  Discharging Unit Phone Number: [phone]    Emergency Contact:   Extended Emergency Contact Information  Primary Emergency Contact: [address] [place] [address] Phone: [phone]  Mobile Phone: [phone]  Relation: Child  Secondary Emergency Contact: Angelita Hdez  Address: [address] [place] [address],[address] [facility] Phone: [phone]  Work Phone: [phone]  Relation: Child    Past Surgical History:  Past Surgical History:   Procedure Laterality Date    CORONARY ANGIOPLASTY      DIAGNOSTIC CARDIAC CATH LAB PROCEDURE      EYE SURGERY Bilateral     cataracts    JOINT REPLACEMENT      MITRAL VALVE SURGERY      PACEMAKER PLACEMENT  11    PACEMAKER PLACEMENT  13    generator replacement, biventricular lead placement; programmed to DDDR with lower rate 60 and upper tracking of 120    TOTAL HIP ARTHROPLASTY         Immunization History:   Immunization History   Administered Date(s) Administered    Pneumococcal Polysaccharide ([id number]) 2014    Tdap (Boostrix, Adacel) 2014       Active Problems:  Patient Active Problem List Diagnosis Code    Atrial fibrillation ([facility]) I48.91    Hyperlipidemia E78.5    Hypertension I10    Renal insufficiency N28.9    Cardiomyopathy ([facility] [address] I42.9    Pacemaker Z95.0    Renal failure, acute on chronic ([facility]) N17.9, N18.9    Pubic ramus fracture ([facility]) S32.599A    Atrial flutter ([facility]) I48.92    Congestive heart failure ([facility]) I50.9    Acute on chronic systolic CHF (congestive heart failure), NYHA class 3 ([facility]) I50.23    Chronic cor pulmonale ([facility]) I27.81    S/P mitral valve repair Z98.890    Traumatic closed fracture of C1 vertebra with minimal displacement, initial encounter ([facility]) S12.000A       Isolation/Infection:   Isolation          No Isolation            Nurse Assessment:  Last Vital Signs: /87   Pulse 90   Temp 98.1 °F (36.7 °C) (Oral)   Resp 18   Ht 5' 3\" (1.6 m)   Wt 176 lb 9.6 oz (80.1 kg)   SpO2 93%   BMI 31.28 kg/m²     Last documented pain score (0-10 scale): Pain Level: 0  Last Weight:   Wt Readings from Last 1 Encounters:   05/14/19 176 lb 9.6 oz (80.1 kg)     Mental Status:  disoriented and alert    IV Access:  - None    Nursing Mobility/ADLs:  Walking   Dependent  Transfer  Dependent  Bathing  Dependent  Dressing  Dependent  Toileting  Dependent  Feeding  Dependent  Med Admin  Assisted  Med Delivery   crushed    Wound Care Documentation and Therapy:  Wound 05/05/19 Buttocks Mid stage 2 (Active)   Wound Pressure Stage  2 5/10/2019  8:00 PM   Dressing Status Clean;Dry; Intact 5/14/2019  8:52 AM   Dressing/Treatment Open to air 5/14/2019  8:52 AM   Wound Assessment Clean;Dry; Intact 5/10/2019  4:00 AM   Drainage Amount None 5/14/2019  8:52 AM   Odor None 5/14/2019  8:52 AM   Kassie-wound Assessment Clean;Dry; Intact 5/10/2019  4:00 AM   Number of days: 9       Wound 05/09/19 Foot Right;Dorsal;Left Venous blistering/unroofing (Active)   Wound Venous 5/10/2019  8:00 PM   Dressing Status Clean;Dry; Intact 5/14/2019  8:52 AM   Dressing Changed Changed/New 5/9/2019  4:46 PM Dressing/Treatment Alginate with Ag;ABD;Dry Dressing; Ace Wrap 5/14/2019  8:52 AM   Wound Cleansed Wound cleanser 5/9/2019 11:52 AM   Wound Length (cm) 4 cm 5/9/2019 11:52 AM   Wound Width (cm) 4 cm 5/9/2019 11:52 AM   Wound Depth (cm) 0.2 cm 5/9/2019 11:52 AM   Wound Surface Area (cm^2) 16 cm^2 5/9/2019 11:52 AM   Wound Volume (cm^3) 3.2 cm^3 5/9/2019 11:52 AM   Wound Assessment MIGUEL ÁNGEL 5/10/2019  3:36 PM   Drainage Amount None 5/14/2019  8:52 AM   Drainage Description Foul purulent 5/9/2019  4:46 PM   Odor Strong 5/9/2019  4:46 PM   Margins Unattached edges 5/9/2019  4:46 PM   Kassie-wound Assessment Yellow-brown;Hyperpigmented;Edema 5/9/2019  4:46 PM   [place]%Wound Bed 50 5/9/2019  4:46 PM   Red%Wound Bed 50 5/9/2019  4:46 PM   Number of days: 4        Elimination:  Continence:   · Bowel: No  · Bladder: patel in place   Urinary Catheter: Insertion Date: 5/11/19   Colostomy/Ileostomy/Ileal Conduit: No       Date of Last BM: 5/13/19    Intake/Output Summary (Last 24 hours) at 5/14/2019 1129  Last data filed at 5/14/2019 0602  Gross per 24 hour   Intake 315 ml   Output 110 ml   Net 205 ml     I/O last 3 completed shifts: In: 315 [P.O.:315]  Out: 110 [Urine:110]    Safety Concerns:     History of Falls (last 30 days) and At Risk for Falls    Impairments/Disabilities:      Vision and Hearing    Nutrition Therapy:  Current Nutrition Therapy:   - Oral Diet:  Dysphagia 1 pureed    Routes of Feeding: Oral  Liquids: No Restrictions  Daily Fluid Restriction: no  Last Modified Barium Swallow with Video (Video Swallowing Test): not done    Treatments at the Time of Hospital Discharge:   Respiratory Treatments: n/a  Oxygen Therapy:  is not on home oxygen therapy.   Ventilator:    - No ventilator support    Rehab Therapies: Physical Therapy and Occupational Therapy  Weight Bearing Status/Restrictions: No weight bearing restirctions  Other Medical Equipment (for information only, NOT a DME order):  {EQUIPMENT:[id number]}  Other

## 2019-05-14 NOTE — CONSULTS
[facility]  Palliative Medicine Consultation Note      Date Of Admission:5/5/2019  Date of consult: 05/14/1905/13/19  Seen by [facility] in the past:  No    Recommendations:        PC did stop in and see patient, the nurses were doing dressing changes to her BLE and she was resting with her eyes closed. Reviewed case with Dr. Sathya Sigala, and attempted to call patient's daughter MELISSA [facility] and unfortunately she was at work and unable to talk with PC. PC did asked RN/CM to place the outpatient palliative care referral so they can follow up with patient after discharge. 1. Goals of Care/Advanced Care planning/Code status: Limited: yes to intubation, no to all other measures. 2. Pain: patient appeared very comfortable during her BLE dressing change  3. SOB: patient is on RA  4. C1 fracture: patient in a C collar, did not have any surgery and will go to SNF at discharge. 5. Dysphagia: speech therapy following, currently on pureed diet with thin liquids and assistance with meals. 6. Disposition: SNF    Reason for Consult:         __x___ Goals of Care  __x___ Code Status Discussion/Advanced Care Planning   __x___ Psychosocial/Family Support  _____ Symptom Management  _____ Other (Specify)    Requesting Physician: Dr. Greta Alonso:  C1 fracture     History Obtained From:  electronic medical record    History of Present Illness:         Patient is a 80 y.o. female with a PMHx of AF (on warfarin), HFrEF (30-35%) with Biventricular ICD and HTN who presented to [facility] after an unwitnessed fall. While at [facility], she was found to have nondisplaced fracture involving base of the dens of C1 on CT of C-spine and she was transferred to [facility] for further workup and management.      Subjective:                     Past Medical History:        Diagnosis Date    Atrial fibrillation [facility])     cardioversion 4/2010 and 5/2011    Cardiomyopathy [facility])     CHF (congestive heart failure) (HCC)     Heart murmur  Hyperlipidemia     Hypertension     Mitral insufficiency     Pacemaker 12/23/13    original PM 11/18/11; 12/23/13 generator replace and biventricular lead placement       Past Surgical History:        Procedure Laterality Date    CORONARY ANGIOPLASTY      DIAGNOSTIC CARDIAC CATH LAB PROCEDURE      EYE SURGERY Bilateral     cataracts    JOINT REPLACEMENT      MITRAL VALVE SURGERY      PACEMAKER PLACEMENT  11/18/11    PACEMAKER PLACEMENT  12/23/13    generator replacement, biventricular lead placement; programmed to DDDR with lower rate 60 and upper tracking of 120    TOTAL HIP ARTHROPLASTY         Current Medications:    Current Facility-Administered Medications: bethanechol (URECHOLINE) tablet 5 mg, 5 mg, Oral, TID  tamsulosin (FLOMAX) capsule 0.4 mg, 0.4 mg, Oral, Daily  megestrol (MEGACE) 40 MG/ML suspension 400 mg, 400 mg, Oral, Daily  warfarin (COUMADIN) daily dosing (placeholder), , Other, See Admin Instructions  QUEtiapine (SEROQUEL) tablet 12.5 mg, 12.5 mg, Oral, Nightly  HYDROCERIN cream CREA, , Topical, BID  amiodarone (CORDARONE) tablet 200 mg, 200 mg, Oral, Once per day on Mon Tue Wed Thu Fri  melatonin tablet 2 mg, 2 mg, Oral, Once  carvedilol (COREG) tablet 25 mg, 25 mg, Oral, BID WC  [Held by provider] furosemide (LASIX) tablet 40 mg, 40 mg, Oral, Daily  levothyroxine (SYNTHROID) tablet 25 mcg, 25 mcg, Oral, QAM AC  sodium chloride flush 0.9 % injection 10 mL, 10 mL, Intravenous, 2 times per day  sodium chloride flush 0.9 % injection 10 mL, 10 mL, Intravenous, PRN  magnesium hydroxide (MILK OF MAGNESIA) 400 MG/5ML suspension 30 mL, 30 mL, Oral, Daily PRN  ondansetron (ZOFRAN) injection 4 mg, 4 mg, Intravenous, Q6H PRN  perflutren lipid microspheres (DEFINITY) injection 1.65 mg, 1.5 mL, Intravenous, ONCE PRN    Allergies:  Patient has no known allergies. Social History:    Patient currently lives (currently going to a SNF)    Review of Systems -   Patient resting.      Objective: LABGLOM 35 05/14/2019    GLUCOSE 97 05/14/2019    PROT 6.4 05/04/2019    PROT 7.3 09/13/2011    LABALBU 3.3 05/14/2019    CALCIUM 8.8 05/14/2019    BILITOT 1.1 05/04/2019    ALKPHOS 108 05/04/2019    AST 35 05/04/2019    ALT 24 05/04/2019     Hepatic Function Panel:    Lab Results   Component Value Date    ALKPHOS 108 05/04/2019    ALT 24 05/04/2019    AST 35 05/04/2019    PROT 6.4 05/04/2019    PROT 7.3 09/13/2011    BILITOT 1.1 05/04/2019    BILIDIR 0.14 05/03/2011    IBILI 0.3 05/03/2011    LABALBU 3.3 05/14/2019     Albumin:    Lab Results   Component Value Date    LABALBU 3.3 05/14/2019         Conclusion/Time spent:         Recommendations see above    Time spent with patient and/or family: 10  Time reviewing records: 10  Time communicating with staff: 10    A total of 30 minutes spent with the patient and family on unit greater than 50% in counseling regarding palliative care and in goals of care for the patient. Thank you to Dr. Serafin Decker for this consultation. We will continue to follow Ms. Gotti's care as needed.       Tri Cohen, APRN/CNP  Palliative Care  [phone]

## 2019-05-14 NOTE — CARE COORDINATION
Case Management Discharge Assessment    2019  [facility])  Clinical Case Management Department    Received a call from [place] at [facility] and they are able to accept. FIGUEROA henson. Called pt's daughter Froilan Akins to inform her of discharge plans and she was agreeable. Patient:  Neale Merlin  MRN: [id number] / : 3/3/1929  ACCT: [de-identified]     Emergency Contacts  Healthcare Agent Appointed: Healthcare power of   Healthcare Agent's Name: Davis Martinez and [employer] [address] Agent's Phone Number: [phone]    Admission Documentation  Attending Provider: Cherie Azul MD  Admit date/time: 2019 12:55 AM  Status: Inpatient [101]  Diagnosis: Traumatic closed fracture of C1 vertebra with minimal displacement, initial encounter [facility])     Readmission within last 30 days:  no     Living Situation  Discharge Planning  Living Arrangements: Alone  Support Systems: Family Members  Potential Assistance Needed: [address] [place] [address] Road: OT, PT  Patient expects to be discharged to[de-identified] home  Expected Discharge Date: 19    Service Assessment:       Values / Beliefs  Do you have any ethnic, cultural, sacramental, or spiritual Samaritan needs you would like us to be aware of while you are in the hospital?: No    Advance Directives (For Healthcare)  Healthcare Directive: Yes, patient has an advance directive for healthcare treatment  Type of Healthcare Directive: Durable power of  for health care, Living will  Copy in Chart: No, copy requested from family  [facility] Agent's Name: Dvais Martinez and [employer] [address] [place] [address] Agent's Phone Number: [phone]  If you are unable to speak for yourself, does your Healthcare Agent or Legal Spokesperson know your healthcare wishes?: Yes                        [facility] Medications:  No  Does patient want to participate in local refill/meds to beds program?: No    Notification completed in HENS/PAS? Yes     IMM  Yes verbal understanding with Margy-daughter    Discharge disposition: SNF:[facility] Phone: [phone] Fax: [phone]     LOC SNF  Name of [facility]  Phone of Facility [phone]  Fax of Facility [phone]    Mode of Transport medical transport  Name of [address]   Number of Transport [phone]  Time of Transport Jenny Khanna and her family were provided with choice of provider; she and her family are in agreement with the discharge plan.       Care Transitions patient: Windy Keller RN  [facility]  Case Management Department  Ph: [phone] Fax: [phone]

## 2019-05-14 NOTE — PROGRESS NOTES
Patient oriented to self only. VSS. Patient turned and repositioned frequently overnight. Tolerating food and fluids. Baxter J collar on at all times. No changes in neuro status overnight. Minimal output from amanda, resident MD notified. Fall precautions in place, will continue to monitor.

## 2019-06-02 PROBLEM — J96.22 ACUTE ON CHRONIC RESPIRATORY FAILURE WITH HYPOXIA AND HYPERCAPNIA (HCC): Status: ACTIVE | Noted: 2019-01-01

## 2019-06-02 PROBLEM — N17.9 ARF (ACUTE RENAL FAILURE) (HCC): Status: ACTIVE | Noted: 2019-01-01

## 2019-06-02 PROBLEM — J96.21 ACUTE ON CHRONIC RESPIRATORY FAILURE WITH HYPOXIA AND HYPERCAPNIA (HCC): Status: ACTIVE | Noted: 2019-01-01

## 2019-06-02 NOTE — ED NOTES
Fall risk screening completed. Fall risk bracelet applied to patient. Non-skid socks provided and placed on patient. Bed alarm applied and activated. The call light is within the patient's reach, and instructions for use were provided. The bed is in the lowest position with wheels locked. The patient has been advised to notify staff, using the call light, if there is a need to get up or use restroom. The patient is unable to vernalize verbalized understanding of safety precautions and how to contact staff for assistance. Frequent rounding by staff.      Yani Navarro RN  06/02/19 3439

## 2019-06-02 NOTE — ED NOTES
Bed: [facility]  Expected date: 6/2/19  Expected time: 2:58 AM  Means of arrival: [facility]  Comments:  COPD exacerbation     Ronen Reno RN  06/02/19 3632

## 2019-06-02 NOTE — ED NOTES
Report called to [address] Handoff of pts pain score and fall risk.       Hever Zarco RN  06/02/19 2526

## 2019-06-02 NOTE — PROGRESS NOTES
Clinical Pharmacy Note  Renal Dose Adjustment    Verónica Rosas is receiving Levofloxacin. This medication is renally eliminated. Based on the patient's Estimated Creatinine Clearance: 15 mL/min (A) (based on SCr of 2.2 mg/dL (H)). and urine output, the dose has been adjusted to 500mg x 1 dose, then 250mg q48h per protocol. Pharmacy will continue to monitor and adjust dose as needed for changes in renal function.      KATI Hart [facility]  6/2/2019  8:19 AM

## 2019-06-02 NOTE — ED NOTES
Bedside report given to FLORECITA Pritchett to assume care. Handoff pts pain score.       Tiburcio Ring, FLORECITA  06/02/19 4305

## 2019-06-02 NOTE — ED NOTES
RN at bedside pt remover high flow NC. Pt redirected, RT explained the importance of keeping o2 in place. No evidence of learning.       Elijah De Luna RN  06/02/19 4587

## 2019-06-02 NOTE — H&P
Hospital Medicine History & Physical      PCP: Karen Leung MD    Date of Admission: 6/2/2019    Date of Service: Pt seen/examined on 6/2/2019    Chief Complaint:      Chief Complaint   Patient presents with    COPD     Patient arrives from nursing home with COPD exacerbation x 1 day. Patient has audible crackles from across the room. History Of Present Illness: The patient is a 80 y.o. female with a past medical history of CHF with EF 35 % and AICD , A. Fib on coumadin , hypertension, hyperlipidemia who presents to [facility] with shortness of breath. Patient is very lethargic and no history could be obtained from the patient. She is grossly fluid overloaded and is needing 2 L of oxygen to maintain sats above 90%. Shouldn't was discharged on 5/14/2019. Patient was admitted to [facility] for C1 cervical fracture due to a fall and was treated by collar. Hospital course was complicated by urinary retention and UTI and acute encephalopathy. Past Medical History:        Diagnosis Date    Atrial fibrillation [facility])     cardioversion 4/2010 and 5/2011    Cardiomyopathy [facility])     CHF (congestive heart failure) ([address]     Heart murmur     Hyperlipidemia     Hypertension     Mitral insufficiency     Pacemaker 12/23/13    original PM 11/18/11; 12/23/13 generator replace and biventricular lead placement       Past Surgical History:        Procedure Laterality Date    CORONARY ANGIOPLASTY      DIAGNOSTIC CARDIAC CATH LAB PROCEDURE      EYE SURGERY Bilateral     cataracts    JOINT REPLACEMENT      MITRAL VALVE SURGERY      PACEMAKER PLACEMENT  11/18/11    PACEMAKER PLACEMENT  12/23/13    generator replacement, biventricular lead placement; programmed to DDDR with lower rate 60 and upper tracking of 120    TOTAL HIP ARTHROPLASTY         Medications Prior to Admission:    Prior to Admission medications    Medication Sig Start Date End Date Taking?  Authorizing Provider   megestrol [id number]    UA:  Recent Labs     06/02/19  0330   COLORU YELLOW   PHUR 5.0   WBCUA 6*   RBCUA 248*   CLARITYU CLOUDY*   SPECGRAV 1.014   LEUKOCYTESUR TRACE*   UROBILINOGEN 0.2   BILIRUBINUR Negative   BLOODU LARGE*   GLUCOSEU Negative   KETUA Negative       Microbiology:  No results for input(s): LABGRAM, LABANAE, ORG, CXSURG in the last 72 hours. Nasal Culture: No results for input(s): ORG, MRSAPCR in the last 72 hours. Blood Culture: No results for input(s): BC, BLOODCULT2, ORG in the last 72 hours. Fungal Culture:   No results for input(s): FUNGSM in the last 72 hours. No results for input(s): FUNCXBLD in the last 72 hours. CSF Culture:  No results for input(s): COLORCSF, APPEARCSF, CFTUBE, CLOTCSF, WBCCSF, RBCCSF, NEUTCSF, NUMCELLSCSF, LYMPHSCSF, MONOCSF, GLUCCSF, VOLCSF in the last 72 hours. Respiratory Culture:  No results for input(s): Gardenia Gallery in the last 72 hours. AFB:No results for input(s): AFBSMEAR in the last 72 hours. Urine Culture  No results for input(s): LABURIN in the last 72 hours. RADIOLOGY:    XR CHEST PORTABLE   Final Result   Cardiomegaly with small effusions and borderline edema. Basilar opacities   favored to reflect atelectasis in the setting of shallow inspiratory effort. Superimposed infection could be present in the appropriate context.              Previous medical records personally reviewed and analyzed         PHYSICIAN CERTIFICATION    I certify that Eduar Mcwilliams is expected to be hospitalized for > 2 midnights based on the following assessment and plan:    ASSESSMENT/PLAN:  Active Hospital Problems    Diagnosis Date Noted    ARF (acute renal failure) ([facility] [address] [N17.9] 06/02/2019     Acute on chronic CHF exacerbation with acute metabolic encephalopathy and respiratory distress with acute hypoxic respiratory failure and acute renal failure, post obstructive with hyperkalemia  -Clinically patient is in respiratory distress and needing 2 L of oxygen to maintain her sats greater than 90%. ABG was ordered which showed acidosis bicarbonate via and hypoxia at 72 on 12 L. Patient is fluid overloaded we'll give her a dose of Lasix.   -Is acute kidney injury with hyperkalemia. Hyperkalemia was treated in the ER and possibly she has cardiorenal syndrome. We will monitor with diuresis. 1.5 L of predicted after Bradshaw was placed    Multiple attempts were tried to contact the family. Discussed with the family and she is a DO NOT RESUSCITATE CC comfort care only. Explained to the family that patient is very critical. They understand and want her to be kept comfortable. We will continue Lasix for comfort. Add morphine and Ativan as comfort measures. Prognosis is very poor and family is aware. H/o cervical fracture  -needs cervical collar for 6-12 weeks. But as patient is [address] will hold off on the collar. DVT Prophylaxis: none, comfort care patient   Diet: No diet orders on file  Code Status: Prior  Due to the immediate potential for life-threatening deterioration due to acute hypoxic respiratory failure with acute kidney injury, respiratory distress and CHF exacerbation , I spent 60minutes providing critical care. This time is excluding time spent performing procedures. Dispo - ct [facility] Dwight Abreu MD  The note was completed using EMR. Every effort was made to ensure accuracy; however, inadvertent computerized transcription errors may be present. Thank you Julia Gomez MD for the opportunity to be involved in this patient's care. If you have any questions or concerns please feel free to contact me at [phone].

## 2019-06-02 NOTE — PROGRESS NOTES
Spoke with Daughter Jeovanny Grace who agrees with plan for comfort measures. Message sent to MD with daughter number to update her.

## 2019-06-02 NOTE — PROGRESS NOTES
Pt arrived to room 4122 from ED. Pt is non responsive. Audible crackles heard while standing next to pt. Small amount of sputum suctioned from mouth. O2 sats 86% on 4L. RT at bedside. O2 up to 12L with sats at 92%. Bradshaw in place. Charge RN notified of pt status and at bedside. MD notified of pt status upon arrival to floor and at bedside.

## 2019-06-02 NOTE — ED PROVIDER NOTES
[facility]  eMERGENCY dEPARTMENT eNCOUnter      Pt Name: Pieter Wheeler  MRN: [id number]  [place] 3/3/1929  Date of evaluation: 6/2/2019  Provider: Harika Gutierrez MD  PCP: Nhan Roman MD      CHIEF COMPLAINT       Chief Complaint   Patient presents with    COPD     Patient arrives from nursing home with COPD exacerbation x 1 day. Patient has audible crackles from across the room. HISTORY OFPRESENT ILLNESS   (Location/Symptom, Timing/Onset, Context/Setting, Quality, Duration, Modifying Factors,Severity)  Note limiting factors. Pieter Wheeler is a 80 y.o. female Arrives short of breath she's been more short of breath for about 24 hours she has a history of congestive heart failure she is a DNR comfort care she is not complaining of anything. Nursing Notes were all reviewed and agreed with or any disagreements were addressed  in the HPI. REVIEW OF SYSTEMS    (2-9 systems for level 4, 10 or more for level 5)     Review of Systems    Positives and Pertinent negatives as per HPI. Except as noted above in the ROS, all other systems were reviewed andnegative.        PASTMEDICAL HISTORY     Past Medical History:   Diagnosis Date    Atrial fibrillation [facility])     cardioversion 4/2010 and 5/2011    Cardiomyopathy [facility])     [facility]F [facility]congestive heart failure) [facility] [address] [facility]     Heart murmur     Hyperlipidemia     Hypertension     Mitral insufficiency     Pacemaker 12/23/13    original PM 11/18/11; 12/23/13 generator replace and biventricular lead placement         SURGICAL HISTORY       Past Surgical History:   Procedure Laterality Date    CORONARY ANGIOPLASTY      DIAGNOSTIC CARDIAC CATH LAB PROCEDURE      EYE SURGERY Bilateral     cataracts    JOINT REPLACEMENT      MITRAL VALVE SURGERY      PACEMAKER PLACEMENT  11/18/11    PACEMAKER PLACEMENT  12/23/13    generator replacement, biventricular lead placement; programmed to DDDR with lower rate 60 Days per week: Not on file     Minutes per session: Not on file    Stress: Not on file   Relationships    Social connections:     Talks on phone: Not on file     Gets together: Not on file     Attends Jain service: Not on file     Active member of club or organization: Not on file     Attends meetings of clubs or organizations: Not on file     Relationship status: Not on file    Intimate partner violence:     Fear of current or ex partner: Not on file     Emotionally abused: Not on file     Physically abused: Not on file     Forced sexual activity: Not on file   Other Topics Concern    Not on file   Social History Narrative    Not on file       SCREENINGS      @[facility]([id number])@      PHYSICAL EXAM    (up to 7 for level 4, 8 or more for level 5)     ED Triage Vitals   BP Temp Temp src Pulse Resp SpO2 Height Weight   06/02/19 0309 -- -- 06/02/19 0311 06/02/19 0311 06/02/19 0309 06/02/19 0311 06/02/19 0311   123/65   64 19 (!) 86 % 5' 2\" (1.575 m) 140 lb 3.4 oz (63.6 kg)       Physical Exam      General Appearance:  Alert, cooperative, no distress, appears stated age. Head:  Normocephalic, without obviousabnormality, atraumatic. Eyes:  conjunctiva/corneas clear, EOM's intact. Sclera anicteric. ENT: Mucous membranes moist.   Neck: Supple, symmetrical, trachea midline, no adenopathy. No jugular venous distention. Lungs:   Clear to auscultation bilaterally, respirationsunlabored. No rales, rhonchi or wheezes. Chest Wall:  No tenderness. Heart:  Regular rate and rhythm, S1 and S2 normal, no murmur, rub or gallop. Abdomen:   Soft, non-tender, bowel sounds active,   no masses, no organomegaly. Extremities: No edema, cords or calf tenderness. Full range of motion. Pulses: 2+ and symmetric   Skin: Turgor is normal, no rashes or lesions. Neurologic: Alert and oriented X 3. No focal findings.   Motor grossly normal.  Speech clear, no drift, CN III-XII grossly intact,        DIAGNOSTIC RESULTS LABS:    Labs Reviewed   COMPREHENSIVE METABOLIC PANEL W/ REFLEX TO MG FOR LOW K - Abnormal; Notable for the following components:       Result Value    Potassium reflex Magnesium 6.0 (*)     CO2 20 (*)     Glucose 134 (*)     BUN 49 (*)     CREATININE 2.2 (*)     GFR Non- 21 (*)     GFR  25 (*)     All other components within normal limits    Narrative:     Hailye Stone tel. [phone],  Chemistry results called to and read back by Lawyer Margarito kirkpatrick, 40/97/6278  05:01, by [facility]  Performed at:  [address] [facility] [employer] [address]   Phone [phone]   BRAIN NATRIURETIC PEPTIDE - Abnormal; Notable for the following components:    Pro-BNP 15,425 (*)     All other components within normal limits    Narrative:     Performed at:  [address] [facility] [employer] [address]   Phone [phone]   TROPONIN - Abnormal; Notable for the following components:    Troponin 0.11 (*)     All other components within normal limits    Narrative:     Performed at:  [address] [facility] [employer] [address]   Phone [phone]   PROTIME-INR - Abnormal; Notable for the following components:    Protime 26.8 (*)     INR 2.35 (*)     All other components within normal limits    Narrative:     Performed at:  [address] [facility] [employer] [address]   Phone [phone]   APTT - Abnormal; Notable for the following components:    aPTT 37.8 (*)     All other components within normal limits    Narrative:     Performed at:  [address] [facility] [employer] [address]   Phone [phone]   URINE RT REFLEX TO CULTURE - Abnormal; Notable for the following components:    Clarity, UA CLOUDY (*)     Blood, Urine LARGE (*)     Leukocyte Esterase, Urine TRACE (*)     All other Patient was given thefollowing medications:  Medications   dextrose 50 % solution 25 g (has no administration in time range)   insulin regular (HUMULIN R;NOVOLIN R) injection 10 Units (has no administration in time range)   sodium bicarbonate 50 mEq in dextrose 5 % 1,000 mL infusion (has no administration in time range)   calcium gluconate 1 g in dextrose 5 % 100 mL IVPB (has no administration in time range)   ipratropium-albuterol (DUONEB) nebulizer solution 1 ampule (1 ampule Inhalation Given 6/2/19 6796)           The patient tolerated their visit well. The patient and / or the familywere informed of the results of any tests, a time was given to answer questions. FINAL IMPRESSION      1. Acute systolic congestive heart failure ([address]    2. Acute renal failure with tubular necrosis (HCC)    3. Hyperkalemia          DISPOSITION/PLAN   DISPOSITION Decision To Admit 06/02/2019 05:13:59 AM  Shunt with acute renal failure and CHF elevated troponin multisystem organ failure DNR CARIDAD Marin for comfort measures I have treated the hyperkalemia she is 100% oxygen    PATIENT REFERRED TO:  No follow-up provider specified.     DISCHARGE MEDICATIONS:  New Prescriptions    No medications on file       DISCONTINUED MEDICATIONS:  Discontinued Medications    No medications on file              (Please note that portions of this note were completed with a voice recognition program.  Efforts were made to edit the dictations but occasionally words are mis-transcribed.)    Eduarda Rojas MD (electronically signed)      Eduarda Rojas MD  06/02/19 2581

## 2019-06-02 NOTE — ED NOTES
Pt presents to the ER AOX2 from NH with c/o COPD exacerbation x 1 day. Pt has audible crackles from across the room. Pt repeatedly removing O2 NC. MD Yanique Conner at bedside.       Isiah Kiran RN  06/02/19 6349

## 2019-06-03 NOTE — PROGRESS NOTES
This RN walked in to room, patient appeared to have no respirations, no pulse. Patient DNR-CC. Called Dr. Lizet Krishna to assess.

## 2019-06-03 NOTE — SIGNIFICANT EVENT
Called to pronounce  No heart sounds.  No breath sounds heard  Pupils fixed  No response to pain    Time of death 135 am    Will notify attending   Family aware    Electronically signed by Yaneth Delgadillo MD on 6/3/2019 at 1:47 AM

## 2019-06-03 NOTE — PLAN OF CARE
Problem: Falls - Risk of:  Goal: Will remain free from falls  Description  Will remain free from falls  Outcome: Ongoing   Pt free of falls this shift. Fall precautions in place. Bed in lowest position side rails x2. Fall precaution indication light on. Exit alarms in place. Non slip socks on. Needed items within reach. Call light within reach. Problem: Safety:  Goal: Free from accidental physical injury  Description  Free from accidental physical injury  Outcome: Ongoing   Pt free of accidental physical injury. Bed in lowest position, wheels locked, ID band correct and in place, call light within reach. Problem: Skin Integrity:  Goal: Skin integrity will stabilize  Description  Skin integrity will stabilize  Outcome: Ongoing   Skin warm and dry.

## 2019-06-03 NOTE — DISCHARGE SUMMARY
Hospital Medicine Discharge/ Death  Summary      Patient ID: Nichelle Area      Patient's PCP: Julia Gomez MD    Admit Date: 6/2/2019     Discharge Date: 6/3/2019  The patient was seen and examined on day of discharge and this discharge summary is in conjunction with any daily progress note from day of discharge. Admitting Physician: Ana Garcia MD    Discharge Physician: Phil Dietz MD     Admitted for   Chief Complaint   Patient presents with    COPD     Patient arrives from nursing home with COPD exacerbation x 1 day. Patient has audible crackles from across the room. Admitting Diagnosis ARF (acute renal failure) ([facility] [address] [N17.9]    Discharge Diagnoses: Active Hospital Problems    Diagnosis Date Noted    ARF (acute renal failure) ([facility] [address] [facility] [N17.9] 06/02/2019    Acute on chronic respiratory failure with hypoxia and hypercapnia (HCC) [H88.39, J96.22] 06/02/2019    Acute on chronic systolic congestive heart failure [facility]) [I50.23]          Hospital Course: The patient is a 80 y.o. female with a past medical history of CHF with EF 35 % and AICD , A. Fib on coumadin , hypertension, hyperlipidemia who presents to [facility] with shortness of breath    Acute on chronic CHF exacerbation with acute metabolic encephalopathy and respiratory distress with acute hypoxic respiratory failure and acute renal failure, post obstructive with hyperkalemia- family elected for comfort care. Patient passed away on 6/3/19 at 1.35 am. Cause of death acute hypoxic hypercapnic resp failure sec to CHF exacerbation. Consults:     IP CONSULT TO SPIRITUAL SERVICES  IP CONSULT TO PALLIATIVE CARE  IP CONSULT TO HOSPICE            No future appointments. Time Spent on discharge is more than 30 minutes in the examination, evaluation, counseling and review of medications and discharge plan. Signed:  Phli Dietz MD   6/3/2019    The note was completed using EMR.  Every effort was made to ensure accuracy; however, inadvertent computerized transcription errors may be present. Thank you Karen Leung MD for the opportunity to be involved in this patient's care. If you have any questions or concerns please feel free to contact me at [phone].

## 2019-06-03 NOTE — PLAN OF CARE
Problem: Falls - Risk of:  Goal: Will remain free from falls  Description  Will remain free from falls  6/3/2019 0224 by Odilia Whyte RN  Outcome: Ongoing  6/2/2019 2005 by Miguelangel Fabian RN  Outcome: Ongoing  Goal: Absence of physical injury  Description  Absence of physical injury  Outcome: Ongoing     Problem: Risk for Impaired Skin Integrity  Goal: Tissue integrity - skin and mucous membranes  Description  Structural intactness and normal physiological function of skin and  mucous membranes.   Outcome: Ongoing     Problem: Safety:  Goal: Free from accidental physical injury  Description  Free from accidental physical injury  6/3/2019 0224 by Odilia Whyte RN  Outcome: Ongoing  6/2/2019 2005 by Miguelangel Fabian RN  Outcome: Ongoing  Goal: Free from intentional harm  Description  Free from intentional harm  Outcome: Ongoing     Problem: Skin Integrity:  Goal: Skin integrity will stabilize  Description  Skin integrity will stabilize  6/3/2019 0224 by Odilia Whyte RN  Outcome: Ongoing  6/2/2019 2005 by Miguelangel Fabian RN  Outcome: Ongoing

## 2019-06-04 ENCOUNTER — TELEPHONE (OUTPATIENT)
Dept: CARDIOLOGY CLINIC | Age: 84
End: 2019-06-04

## 2019-06-04 LAB
ORGANISM: ABNORMAL
URINE CULTURE, ROUTINE: ABNORMAL
URINE CULTURE, ROUTINE: ABNORMAL

## 2019-06-04 NOTE — TELEPHONE ENCOUNTER
Arik Reed from [facility] called in this afternoon wanting to know if Dr. Kelly Fitzgerald will sign Suzanne's death certificate?  She stated she past away 6/3/19    You can reach Arik Reed at #[phone]

## 2019-06-05 NOTE — TELEPHONE ENCOUNTER
Left message with answering service that death certificate can be delivered for Dr. Noelle Bundy to sign.

## 2019-06-10 NOTE — TELEPHONE ENCOUNTER
Death certificate signed by Dr. Clinton Colon. Copy made and placed in scanning. Spoke with  home representative regarding picking this up. Left at  to be picked up.

## 2019-10-30 ENCOUNTER — ANTI-COAG VISIT (OUTPATIENT)
Dept: PHARMACY | Age: 84
End: 2019-10-30